# Patient Record
Sex: MALE | Race: WHITE | NOT HISPANIC OR LATINO | URBAN - METROPOLITAN AREA
[De-identification: names, ages, dates, MRNs, and addresses within clinical notes are randomized per-mention and may not be internally consistent; named-entity substitution may affect disease eponyms.]

---

## 2023-01-30 ENCOUNTER — OUTPATIENT (OUTPATIENT)
Dept: OUTPATIENT SERVICES | Facility: HOSPITAL | Age: 68
LOS: 1 days | End: 2023-01-30
Payer: COMMERCIAL

## 2023-01-30 VITALS
TEMPERATURE: 98 F | RESPIRATION RATE: 16 BRPM | WEIGHT: 255.74 LBS | SYSTOLIC BLOOD PRESSURE: 150 MMHG | DIASTOLIC BLOOD PRESSURE: 90 MMHG | OXYGEN SATURATION: 97 % | HEART RATE: 78 BPM | HEIGHT: 67 IN

## 2023-01-30 DIAGNOSIS — N20.0 CALCULUS OF KIDNEY: ICD-10-CM

## 2023-01-30 DIAGNOSIS — E11.9 TYPE 2 DIABETES MELLITUS WITHOUT COMPLICATIONS: ICD-10-CM

## 2023-01-30 DIAGNOSIS — R06.09 OTHER FORMS OF DYSPNEA: ICD-10-CM

## 2023-01-30 DIAGNOSIS — J84.10 PULMONARY FIBROSIS, UNSPECIFIED: ICD-10-CM

## 2023-01-30 DIAGNOSIS — E66.01 MORBID (SEVERE) OBESITY DUE TO EXCESS CALORIES: ICD-10-CM

## 2023-01-30 DIAGNOSIS — I10 ESSENTIAL (PRIMARY) HYPERTENSION: ICD-10-CM

## 2023-01-30 DIAGNOSIS — Z98.890 OTHER SPECIFIED POSTPROCEDURAL STATES: Chronic | ICD-10-CM

## 2023-01-30 LAB
A1C WITH ESTIMATED AVERAGE GLUCOSE RESULT: 7.7 % — HIGH (ref 4–5.6)
ANION GAP SERPL CALC-SCNC: 10 MMOL/L — SIGNIFICANT CHANGE UP (ref 7–14)
APPEARANCE UR: CLEAR — SIGNIFICANT CHANGE UP
BILIRUB UR-MCNC: NEGATIVE — SIGNIFICANT CHANGE UP
BLD GP AB SCN SERPL QL: NEGATIVE — SIGNIFICANT CHANGE UP
BUN SERPL-MCNC: 17 MG/DL — SIGNIFICANT CHANGE UP (ref 7–23)
CALCIUM SERPL-MCNC: 9.6 MG/DL — SIGNIFICANT CHANGE UP (ref 8.4–10.5)
CHLORIDE SERPL-SCNC: 102 MMOL/L — SIGNIFICANT CHANGE UP (ref 98–107)
CO2 SERPL-SCNC: 25 MMOL/L — SIGNIFICANT CHANGE UP (ref 22–31)
COLOR SPEC: YELLOW — SIGNIFICANT CHANGE UP
CREAT SERPL-MCNC: 0.98 MG/DL — SIGNIFICANT CHANGE UP (ref 0.5–1.3)
DIFF PNL FLD: ABNORMAL
EGFR: 85 ML/MIN/1.73M2 — SIGNIFICANT CHANGE UP
ESTIMATED AVERAGE GLUCOSE: 174 — SIGNIFICANT CHANGE UP
GLUCOSE SERPL-MCNC: 118 MG/DL — HIGH (ref 70–99)
GLUCOSE UR QL: NEGATIVE — SIGNIFICANT CHANGE UP
HCT VFR BLD CALC: 43.2 % — SIGNIFICANT CHANGE UP (ref 39–50)
HGB BLD-MCNC: 14.1 G/DL — SIGNIFICANT CHANGE UP (ref 13–17)
KETONES UR-MCNC: NEGATIVE — SIGNIFICANT CHANGE UP
LEUKOCYTE ESTERASE UR-ACNC: ABNORMAL
MCHC RBC-ENTMCNC: 29.2 PG — SIGNIFICANT CHANGE UP (ref 27–34)
MCHC RBC-ENTMCNC: 32.6 GM/DL — SIGNIFICANT CHANGE UP (ref 32–36)
MCV RBC AUTO: 89.4 FL — SIGNIFICANT CHANGE UP (ref 80–100)
NITRITE UR-MCNC: NEGATIVE — SIGNIFICANT CHANGE UP
NRBC # BLD: 0 /100 WBCS — SIGNIFICANT CHANGE UP (ref 0–0)
NRBC # FLD: 0 K/UL — SIGNIFICANT CHANGE UP (ref 0–0)
PH UR: 5.5 — SIGNIFICANT CHANGE UP (ref 5–8)
PLATELET # BLD AUTO: 217 K/UL — SIGNIFICANT CHANGE UP (ref 150–400)
POTASSIUM SERPL-MCNC: 3.9 MMOL/L — SIGNIFICANT CHANGE UP (ref 3.5–5.3)
POTASSIUM SERPL-SCNC: 3.9 MMOL/L — SIGNIFICANT CHANGE UP (ref 3.5–5.3)
PROT UR-MCNC: ABNORMAL
RBC # BLD: 4.83 M/UL — SIGNIFICANT CHANGE UP (ref 4.2–5.8)
RBC # FLD: 13 % — SIGNIFICANT CHANGE UP (ref 10.3–14.5)
RH IG SCN BLD-IMP: POSITIVE — SIGNIFICANT CHANGE UP
SODIUM SERPL-SCNC: 137 MMOL/L — SIGNIFICANT CHANGE UP (ref 135–145)
SP GR SPEC: 1.02 — SIGNIFICANT CHANGE UP (ref 1.01–1.05)
UROBILINOGEN FLD QL: SIGNIFICANT CHANGE UP
WBC # BLD: 9.45 K/UL — SIGNIFICANT CHANGE UP (ref 3.8–10.5)
WBC # FLD AUTO: 9.45 K/UL — SIGNIFICANT CHANGE UP (ref 3.8–10.5)

## 2023-01-30 PROCEDURE — 93010 ELECTROCARDIOGRAM REPORT: CPT

## 2023-01-30 RX ORDER — SODIUM CHLORIDE 9 MG/ML
1000 INJECTION, SOLUTION INTRAVENOUS
Refills: 0 | Status: DISCONTINUED | OUTPATIENT
Start: 2023-02-14 | End: 2023-02-15

## 2023-01-30 NOTE — H&P PST ADULT - OTHER CARE PROVIDERS
Dr. Adams, Cardiologist; Dr. Lakhani, Pulmonologist Dr. Adams, Cardiologist; [phone]; Dr. Lakhani, Pulmonologist [phone]

## 2023-01-30 NOTE — H&P PST ADULT - GENITOURINARY COMMENTS
dyspnea when climbing 1 fl stairs. Pt was evaluated by Cardiologist, with normal findings.  Pt saw pulmonologist, CT chest was done, dx with pulmonary fibrosis . Kidney stone (L) incidental  finding on imaging.    Scheduled for Left percutaneous Nephrolithotomy not examined

## 2023-01-30 NOTE — H&P PST ADULT - CARDIOVASCULAR COMMENTS
Pt reports he has SOB on exertion when climbing  1 fl stairs Pt reports he has SOB on exertion when climbing  1 fl stairs.  METS<4

## 2023-01-30 NOTE — H&P PST ADULT - RESPIRATORY AND THORAX COMMENTS
hx of dyspnea when climbing 1 fl stairs. Pt was evaluated by Cardiologist, with normal findings.  Pt saw pulmonologist, CT chest was done, dx with pulmonary fibrosis.  followed q 6 months. hx of dyspnea when climbing 1 fl stairs. Pt was evaluated by Cardiologist, with normal findings.  Pt saw pulmonologist, CT chest was done, dx with pulmonary fibrosis,   followed q 6 months..  No O2

## 2023-01-30 NOTE — H&P PST ADULT - PROBLEM SELECTOR PLAN 3
Spontaneous, unlabored and symmetrical
BMI 40.1, OR booking informed.  CHELO precautions per Stop Bang Questionnaire criteria

## 2023-01-30 NOTE — H&P PST ADULT - PROBLEM SELECTOR PLAN 2
/90 at PST.  I called PMD office, spoke with juan.  Preop Clearance requested on chart.  pt advised to take his  amlodipine and Irbesartan morning of surgery with sip of water

## 2023-01-30 NOTE — H&P PST ADULT - PROBLEM SELECTOR PLAN 1
Left percutaneous Nephrolithotomy 2/14/23.    CBC BMP T&S HgbA1C UA CS EKG    Preop instructions and antibacterial soap given and explained (verbal and written), with teach back.     Preop covid testing protocol reviewed with pt

## 2023-01-30 NOTE — H&P PST ADULT - PROBLEM SELECTOR PLAN 5
Pt recently evaluated by Dr. Adams Cardiologist, requested on chart.  most recent cardiology test reports requested on chart

## 2023-01-30 NOTE — H&P PST ADULT - HISTORY OF PRESENT ILLNESS
66 y/o male with hx of dyspnea when climbing 1 fl stairs. Pt was evaluated by Cardiologist, with normal findings.  Pt saw pulmonologist, CT chest was done, dx with pulmonary fibrosis . Kidney stone (L) incidental  finding on imaging.    Scheduled for Left percutaneous Nephrolithotomy

## 2023-01-30 NOTE — H&P PST ADULT - MUSCULOSKELETAL
no calf tenderness negative normal/ROM intact/no calf tenderness/normal gait/strength 5/5 bilateral upper extremities/strength 5/5 bilateral lower extremities

## 2023-01-30 NOTE — H&P PST ADULT - PROBLEM SELECTOR PLAN 6
Pt recently evaluated by Dr. Lakhani, Pulmonologist, requested on chart.  Most recent PFT's requested on chart Pt recently evaluated by Dr. Lakhani, Pulmonologist. Discussed with Dr. Claros.  No Pulmonary clearance required.  Most recent note and PFT's requested on chart

## 2023-01-30 NOTE — H&P PST ADULT - NSICDXPASTMEDICALHX_GEN_ALL_CORE_FT
PAST MEDICAL HISTORY:  Diabetes mellitus     Hypertension     Kidney stone     Morbid obesity     Obesity      PAST MEDICAL HISTORY:  Diabetes mellitus     Hypertension     Kidney stone     Morbid obesity     Obesity     Pulmonary fibrosis

## 2023-01-31 LAB
CULTURE RESULTS: SIGNIFICANT CHANGE UP
SPECIMEN SOURCE: SIGNIFICANT CHANGE UP

## 2023-02-09 ENCOUNTER — NON-APPOINTMENT (OUTPATIENT)
Age: 68
End: 2023-02-09

## 2023-02-13 ENCOUNTER — TRANSCRIPTION ENCOUNTER (OUTPATIENT)
Age: 68
End: 2023-02-13

## 2023-02-13 NOTE — ASU PATIENT PROFILE, ADULT - NSICDXPASTMEDICALHX_GEN_ALL_CORE_FT
PAST MEDICAL HISTORY:  Diabetes mellitus     Hypertension     Kidney stone     Morbid obesity     Obesity     Pulmonary fibrosis

## 2023-02-13 NOTE — ASU PATIENT PROFILE, ADULT - CAREGIVER NAME
Introduction Text (Please End With A Colon): The following procedure was deferred: Procedure To Be Performed At Next Visit: Biopsy by shave method Detail Level: Detailed lydia

## 2023-02-13 NOTE — ASU PATIENT PROFILE, ADULT - FALL HARM RISK - UNIVERSAL INTERVENTIONS
Bed in lowest position, wheels locked, appropriate side rails in place/Call bell, personal items and telephone in reach/Instruct patient to call for assistance before getting out of bed or chair/Non-slip footwear when patient is out of bed/[place] to call system/Physically safe environment - no spills, clutter or unnecessary equipment/Purposeful Proactive Rounding/Room/bathroom lighting operational, light cord in reach

## 2023-02-14 ENCOUNTER — INPATIENT (INPATIENT)
Facility: HOSPITAL | Age: 68
LOS: 0 days | Discharge: HOME | End: 2023-02-15
Attending: UROLOGY | Admitting: UROLOGY

## 2023-02-14 VITALS
RESPIRATION RATE: 16 BRPM | SYSTOLIC BLOOD PRESSURE: 156 MMHG | HEART RATE: 66 BPM | TEMPERATURE: 98 F | OXYGEN SATURATION: 100 % | WEIGHT: 255.74 LBS | HEIGHT: 67 IN | DIASTOLIC BLOOD PRESSURE: 87 MMHG

## 2023-02-14 DIAGNOSIS — Z98.890 OTHER SPECIFIED POSTPROCEDURAL STATES: Chronic | ICD-10-CM

## 2023-02-14 DIAGNOSIS — N20.0 CALCULUS OF KIDNEY: ICD-10-CM

## 2023-02-14 LAB
ANION GAP SERPL CALC-SCNC: 12 MMOL/L — SIGNIFICANT CHANGE UP (ref 7–14)
BASOPHILS # BLD AUTO: 0.03 K/UL — SIGNIFICANT CHANGE UP (ref 0–0.2)
BASOPHILS NFR BLD AUTO: 0.3 % — SIGNIFICANT CHANGE UP (ref 0–2)
BUN SERPL-MCNC: 21 MG/DL — SIGNIFICANT CHANGE UP (ref 7–23)
CALCIUM SERPL-MCNC: 9.5 MG/DL — SIGNIFICANT CHANGE UP (ref 8.4–10.5)
CHLORIDE SERPL-SCNC: 105 MMOL/L — SIGNIFICANT CHANGE UP (ref 98–107)
CO2 SERPL-SCNC: 24 MMOL/L — SIGNIFICANT CHANGE UP (ref 22–31)
CREAT SERPL-MCNC: 0.88 MG/DL — SIGNIFICANT CHANGE UP (ref 0.5–1.3)
EGFR: 94 ML/MIN/1.73M2 — SIGNIFICANT CHANGE UP
EOSINOPHIL # BLD AUTO: 0.09 K/UL — SIGNIFICANT CHANGE UP (ref 0–0.5)
EOSINOPHIL NFR BLD AUTO: 0.9 % — SIGNIFICANT CHANGE UP (ref 0–6)
GLUCOSE BLDC GLUCOMTR-MCNC: 155 MG/DL — HIGH (ref 70–99)
GLUCOSE BLDC GLUCOMTR-MCNC: 160 MG/DL — HIGH (ref 70–99)
GLUCOSE BLDC GLUCOMTR-MCNC: 274 MG/DL — HIGH (ref 70–99)
GLUCOSE BLDC GLUCOMTR-MCNC: 277 MG/DL — HIGH (ref 70–99)
GLUCOSE SERPL-MCNC: 164 MG/DL — HIGH (ref 70–99)
HCT VFR BLD CALC: 41.2 % — SIGNIFICANT CHANGE UP (ref 39–50)
HGB BLD-MCNC: 13.4 G/DL — SIGNIFICANT CHANGE UP (ref 13–17)
IANC: 6.41 K/UL — SIGNIFICANT CHANGE UP (ref 1.8–7.4)
IMM GRANULOCYTES NFR BLD AUTO: 0.5 % — SIGNIFICANT CHANGE UP (ref 0–0.9)
LYMPHOCYTES # BLD AUTO: 2.34 K/UL — SIGNIFICANT CHANGE UP (ref 1–3.3)
LYMPHOCYTES # BLD AUTO: 24.6 % — SIGNIFICANT CHANGE UP (ref 13–44)
MCHC RBC-ENTMCNC: 30 PG — SIGNIFICANT CHANGE UP (ref 27–34)
MCHC RBC-ENTMCNC: 32.5 GM/DL — SIGNIFICANT CHANGE UP (ref 32–36)
MCV RBC AUTO: 92.4 FL — SIGNIFICANT CHANGE UP (ref 80–100)
MONOCYTES # BLD AUTO: 0.59 K/UL — SIGNIFICANT CHANGE UP (ref 0–0.9)
MONOCYTES NFR BLD AUTO: 6.2 % — SIGNIFICANT CHANGE UP (ref 2–14)
NEUTROPHILS # BLD AUTO: 6.41 K/UL — SIGNIFICANT CHANGE UP (ref 1.8–7.4)
NEUTROPHILS NFR BLD AUTO: 67.5 % — SIGNIFICANT CHANGE UP (ref 43–77)
NRBC # BLD: 0 /100 WBCS — SIGNIFICANT CHANGE UP (ref 0–0)
NRBC # FLD: 0 K/UL — SIGNIFICANT CHANGE UP (ref 0–0)
PLATELET # BLD AUTO: 191 K/UL — SIGNIFICANT CHANGE UP (ref 150–400)
POTASSIUM SERPL-MCNC: 4.5 MMOL/L — SIGNIFICANT CHANGE UP (ref 3.5–5.3)
POTASSIUM SERPL-SCNC: 4.5 MMOL/L — SIGNIFICANT CHANGE UP (ref 3.5–5.3)
RBC # BLD: 4.46 M/UL — SIGNIFICANT CHANGE UP (ref 4.2–5.8)
RBC # FLD: 13.4 % — SIGNIFICANT CHANGE UP (ref 10.3–14.5)
RH IG SCN BLD-IMP: POSITIVE — SIGNIFICANT CHANGE UP
SODIUM SERPL-SCNC: 141 MMOL/L — SIGNIFICANT CHANGE UP (ref 135–145)
WBC # BLD: 9.51 K/UL — SIGNIFICANT CHANGE UP (ref 3.8–10.5)
WBC # FLD AUTO: 9.51 K/UL — SIGNIFICANT CHANGE UP (ref 3.8–10.5)

## 2023-02-14 DEVICE — SURGIFLO MATRIX WITH THROMBIN KIT: Type: IMPLANTABLE DEVICE | Site: LEFT | Status: FUNCTIONAL

## 2023-02-14 DEVICE — CATH 810 SET 8.5FR 88X30CM: Type: IMPLANTABLE DEVICE | Site: LEFT | Status: FUNCTIONAL

## 2023-02-14 DEVICE — GUIDEWIRE SENSOR DUAL-FLEX NITINOL STRAIGHT .038" X 150CM: Type: IMPLANTABLE DEVICE | Site: LEFT | Status: FUNCTIONAL

## 2023-02-14 DEVICE — URETERAL STENT PERCUFLEX PLUS 6FR 26CM: Type: IMPLANTABLE DEVICE | Site: LEFT | Status: FUNCTIONAL

## 2023-02-14 DEVICE — NEPHROSTOMY BALLOON CATH NEPHROMAX 24FR 17CM PTFE: Type: IMPLANTABLE DEVICE | Site: LEFT | Status: FUNCTIONAL

## 2023-02-14 DEVICE — TRILOGY PROBE KIT 3.9MM X 440MM (BLUE): Type: IMPLANTABLE DEVICE | Site: LEFT | Status: FUNCTIONAL

## 2023-02-14 DEVICE — URETERAL CATH AXXCESS OPEN END 6FR 70CM: Type: IMPLANTABLE DEVICE | Site: LEFT | Status: FUNCTIONAL

## 2023-02-14 RX ORDER — CEFAZOLIN SODIUM 1 G
VIAL (EA) INJECTION
Refills: 0 | Status: DISCONTINUED | OUTPATIENT
Start: 2023-02-14 | End: 2023-02-15

## 2023-02-14 RX ORDER — DEXTROSE 50 % IN WATER 50 %
15 SYRINGE (ML) INTRAVENOUS ONCE
Refills: 0 | Status: DISCONTINUED | OUTPATIENT
Start: 2023-02-14 | End: 2023-02-15

## 2023-02-14 RX ORDER — HYDROMORPHONE HYDROCHLORIDE 2 MG/ML
0.5 INJECTION INTRAMUSCULAR; INTRAVENOUS; SUBCUTANEOUS
Refills: 0 | Status: DISCONTINUED | OUTPATIENT
Start: 2023-02-14 | End: 2023-02-14

## 2023-02-14 RX ORDER — HEPARIN SODIUM 5000 [USP'U]/ML
5000 INJECTION INTRAVENOUS; SUBCUTANEOUS EVERY 8 HOURS
Refills: 0 | Status: DISCONTINUED | OUTPATIENT
Start: 2023-02-14 | End: 2023-02-15

## 2023-02-14 RX ORDER — SODIUM CHLORIDE 9 MG/ML
1000 INJECTION, SOLUTION INTRAVENOUS
Refills: 0 | Status: DISCONTINUED | OUTPATIENT
Start: 2023-02-14 | End: 2023-02-15

## 2023-02-14 RX ORDER — DEXTROSE 50 % IN WATER 50 %
12.5 SYRINGE (ML) INTRAVENOUS ONCE
Refills: 0 | Status: DISCONTINUED | OUTPATIENT
Start: 2023-02-14 | End: 2023-02-15

## 2023-02-14 RX ORDER — ACETAMINOPHEN 500 MG
975 TABLET ORAL EVERY 6 HOURS
Refills: 0 | Status: DISCONTINUED | OUTPATIENT
Start: 2023-02-14 | End: 2023-02-15

## 2023-02-14 RX ORDER — DEXTROSE 50 % IN WATER 50 %
25 SYRINGE (ML) INTRAVENOUS ONCE
Refills: 0 | Status: DISCONTINUED | OUTPATIENT
Start: 2023-02-14 | End: 2023-02-15

## 2023-02-14 RX ORDER — CEFAZOLIN SODIUM 1 G
1000 VIAL (EA) INJECTION ONCE
Refills: 0 | Status: COMPLETED | OUTPATIENT
Start: 2023-02-14 | End: 2023-02-14

## 2023-02-14 RX ORDER — ONDANSETRON 8 MG/1
4 TABLET, FILM COATED ORAL ONCE
Refills: 0 | Status: DISCONTINUED | OUTPATIENT
Start: 2023-02-14 | End: 2023-02-15

## 2023-02-14 RX ORDER — INSULIN LISPRO 100/ML
VIAL (ML) SUBCUTANEOUS AT BEDTIME
Refills: 0 | Status: DISCONTINUED | OUTPATIENT
Start: 2023-02-14 | End: 2023-02-15

## 2023-02-14 RX ORDER — INSULIN LISPRO 100/ML
VIAL (ML) SUBCUTANEOUS
Refills: 0 | Status: DISCONTINUED | OUTPATIENT
Start: 2023-02-14 | End: 2023-02-15

## 2023-02-14 RX ORDER — AMLODIPINE BESYLATE 2.5 MG/1
5 TABLET ORAL DAILY
Refills: 0 | Status: DISCONTINUED | OUTPATIENT
Start: 2023-02-14 | End: 2023-02-15

## 2023-02-14 RX ORDER — HYDROMORPHONE HYDROCHLORIDE 2 MG/ML
0.5 INJECTION INTRAMUSCULAR; INTRAVENOUS; SUBCUTANEOUS
Refills: 0 | Status: DISCONTINUED | OUTPATIENT
Start: 2023-02-14 | End: 2023-02-15

## 2023-02-14 RX ORDER — OXYCODONE HYDROCHLORIDE 5 MG/1
10 TABLET ORAL EVERY 4 HOURS
Refills: 0 | Status: DISCONTINUED | OUTPATIENT
Start: 2023-02-14 | End: 2023-02-15

## 2023-02-14 RX ORDER — OXYCODONE HYDROCHLORIDE 5 MG/1
5 TABLET ORAL EVERY 4 HOURS
Refills: 0 | Status: DISCONTINUED | OUTPATIENT
Start: 2023-02-14 | End: 2023-02-15

## 2023-02-14 RX ORDER — CEFAZOLIN SODIUM 1 G
1000 VIAL (EA) INJECTION EVERY 8 HOURS
Refills: 0 | Status: DISCONTINUED | OUTPATIENT
Start: 2023-02-14 | End: 2023-02-15

## 2023-02-14 RX ORDER — LOSARTAN POTASSIUM 100 MG/1
100 TABLET, FILM COATED ORAL DAILY
Refills: 0 | Status: DISCONTINUED | OUTPATIENT
Start: 2023-02-14 | End: 2023-02-15

## 2023-02-14 RX ORDER — GLUCAGON INJECTION, SOLUTION 0.5 MG/.1ML
1 INJECTION, SOLUTION SUBCUTANEOUS ONCE
Refills: 0 | Status: DISCONTINUED | OUTPATIENT
Start: 2023-02-14 | End: 2023-02-15

## 2023-02-14 RX ORDER — HYDRALAZINE HCL 50 MG
5 TABLET ORAL ONCE
Refills: 0 | Status: COMPLETED | OUTPATIENT
Start: 2023-02-14 | End: 2023-02-14

## 2023-02-14 RX ADMIN — Medication 6: at 17:00

## 2023-02-14 RX ADMIN — Medication 100 MILLIGRAM(S): at 22:44

## 2023-02-14 RX ADMIN — Medication 5 MILLIGRAM(S): at 18:01

## 2023-02-14 RX ADMIN — Medication 100 MILLIGRAM(S): at 16:40

## 2023-02-14 RX ADMIN — HEPARIN SODIUM 5000 UNIT(S): 5000 INJECTION INTRAVENOUS; SUBCUTANEOUS at 13:58

## 2023-02-14 RX ADMIN — HEPARIN SODIUM 5000 UNIT(S): 5000 INJECTION INTRAVENOUS; SUBCUTANEOUS at 22:43

## 2023-02-14 RX ADMIN — SODIUM CHLORIDE 125 MILLILITER(S): 9 INJECTION, SOLUTION INTRAVENOUS at 17:33

## 2023-02-14 RX ADMIN — Medication 2: at 22:44

## 2023-02-14 NOTE — PATIENT PROFILE ADULT - FALL HARM RISK - HARM RISK INTERVENTIONS
Communicate Risk of Fall with Harm to all staff/Reinforce activity limits and safety measures with patient and family/Tailored Fall Risk Interventions/Visual Cue: Yellow wristband and red socks/Bed in lowest position, wheels locked, appropriate side rails in place/Call bell, personal items and telephone in reach/Instruct patient to call for assistance before getting out of bed or chair/Non-slip footwear when patient is out of bed/[place] to call system/Physically safe environment - no spills, clutter or unnecessary equipment/Purposeful Proactive Rounding/Room/bathroom lighting operational, light cord in reach Assistance with ambulation/Assistance OOB with selected safe patient handling equipment/Communicate Risk of Fall with Harm to all staff/Monitor gait and stability/Reinforce activity limits and safety measures with patient and family/Sit up slowly, dangle for a short time, stand at bedside before walking/Tailored Fall Risk Interventions/Use of alarms - bed, chair and/or voice tab/Visual Cue: Yellow wristband and red socks/Bed in lowest position, wheels locked, appropriate side rails in place/Call bell, personal items and telephone in reach/Instruct patient to call for assistance before getting out of bed or chair/Non-slip footwear when patient is out of bed/[place] to call system/Physically safe environment - no spills, clutter or unnecessary equipment/Purposeful Proactive Rounding/Room/bathroom lighting operational, light cord in reach

## 2023-02-14 NOTE — ASU PREOP CHECKLIST - SELECT TESTS ORDERED
BMP/CBC/Type and Screen fs/BMP/CBC/Type and Screen/POCT Blood Glucose fs at 10:03- 155/BMP/CBC/Type and Screen/POCT Blood Glucose

## 2023-02-15 ENCOUNTER — TRANSCRIPTION ENCOUNTER (OUTPATIENT)
Age: 68
End: 2023-02-15

## 2023-02-15 VITALS
OXYGEN SATURATION: 100 % | DIASTOLIC BLOOD PRESSURE: 78 MMHG | RESPIRATION RATE: 18 BRPM | TEMPERATURE: 98 F | HEART RATE: 88 BPM | SYSTOLIC BLOOD PRESSURE: 140 MMHG

## 2023-02-15 LAB
ANION GAP SERPL CALC-SCNC: 13 MMOL/L — SIGNIFICANT CHANGE UP (ref 7–14)
BASOPHILS # BLD AUTO: 0.01 K/UL — SIGNIFICANT CHANGE UP (ref 0–0.2)
BASOPHILS NFR BLD AUTO: 0.1 % — SIGNIFICANT CHANGE UP (ref 0–2)
BUN SERPL-MCNC: 22 MG/DL — SIGNIFICANT CHANGE UP (ref 7–23)
CALCIUM SERPL-MCNC: 9.6 MG/DL — SIGNIFICANT CHANGE UP (ref 8.4–10.5)
CHLORIDE SERPL-SCNC: 99 MMOL/L — SIGNIFICANT CHANGE UP (ref 98–107)
CO2 SERPL-SCNC: 24 MMOL/L — SIGNIFICANT CHANGE UP (ref 22–31)
CREAT SERPL-MCNC: 1 MG/DL — SIGNIFICANT CHANGE UP (ref 0.5–1.3)
EGFR: 82 ML/MIN/1.73M2 — SIGNIFICANT CHANGE UP
EOSINOPHIL # BLD AUTO: 0 K/UL — SIGNIFICANT CHANGE UP (ref 0–0.5)
EOSINOPHIL NFR BLD AUTO: 0 % — SIGNIFICANT CHANGE UP (ref 0–6)
GLUCOSE BLDC GLUCOMTR-MCNC: 179 MG/DL — HIGH (ref 70–99)
GLUCOSE BLDC GLUCOMTR-MCNC: 229 MG/DL — HIGH (ref 70–99)
GLUCOSE SERPL-MCNC: 182 MG/DL — HIGH (ref 70–99)
HCT VFR BLD CALC: 39.6 % — SIGNIFICANT CHANGE UP (ref 39–50)
HGB BLD-MCNC: 13.1 G/DL — SIGNIFICANT CHANGE UP (ref 13–17)
IANC: 11.33 K/UL — HIGH (ref 1.8–7.4)
IMM GRANULOCYTES NFR BLD AUTO: 0.5 % — SIGNIFICANT CHANGE UP (ref 0–0.9)
LYMPHOCYTES # BLD AUTO: 0.76 K/UL — LOW (ref 1–3.3)
LYMPHOCYTES # BLD AUTO: 5.9 % — LOW (ref 13–44)
MCHC RBC-ENTMCNC: 29.8 PG — SIGNIFICANT CHANGE UP (ref 27–34)
MCHC RBC-ENTMCNC: 33.1 GM/DL — SIGNIFICANT CHANGE UP (ref 32–36)
MCV RBC AUTO: 90 FL — SIGNIFICANT CHANGE UP (ref 80–100)
MONOCYTES # BLD AUTO: 0.64 K/UL — SIGNIFICANT CHANGE UP (ref 0–0.9)
MONOCYTES NFR BLD AUTO: 5 % — SIGNIFICANT CHANGE UP (ref 2–14)
NEUTROPHILS # BLD AUTO: 11.33 K/UL — HIGH (ref 1.8–7.4)
NEUTROPHILS NFR BLD AUTO: 88.5 % — HIGH (ref 43–77)
NRBC # BLD: 0 /100 WBCS — SIGNIFICANT CHANGE UP (ref 0–0)
NRBC # FLD: 0 K/UL — SIGNIFICANT CHANGE UP (ref 0–0)
PLATELET # BLD AUTO: 233 K/UL — SIGNIFICANT CHANGE UP (ref 150–400)
POTASSIUM SERPL-MCNC: 4.4 MMOL/L — SIGNIFICANT CHANGE UP (ref 3.5–5.3)
POTASSIUM SERPL-SCNC: 4.4 MMOL/L — SIGNIFICANT CHANGE UP (ref 3.5–5.3)
RBC # BLD: 4.4 M/UL — SIGNIFICANT CHANGE UP (ref 4.2–5.8)
RBC # FLD: 13.3 % — SIGNIFICANT CHANGE UP (ref 10.3–14.5)
SODIUM SERPL-SCNC: 136 MMOL/L — SIGNIFICANT CHANGE UP (ref 135–145)
WBC # BLD: 12.81 K/UL — HIGH (ref 3.8–10.5)
WBC # FLD AUTO: 12.81 K/UL — HIGH (ref 3.8–10.5)

## 2023-02-15 RX ORDER — ACETAMINOPHEN 500 MG
3 TABLET ORAL
Qty: 0 | Refills: 0 | DISCHARGE
Start: 2023-02-15

## 2023-02-15 RX ORDER — SODIUM CHLORIDE 9 MG/ML
1000 INJECTION, SOLUTION INTRAVENOUS
Refills: 0 | Status: DISCONTINUED | OUTPATIENT
Start: 2023-02-15 | End: 2023-02-15

## 2023-02-15 RX ORDER — CEPHALEXIN 500 MG
1 CAPSULE ORAL
Qty: 10 | Refills: 0
Start: 2023-02-15 | End: 2023-02-19

## 2023-02-15 RX ADMIN — HEPARIN SODIUM 5000 UNIT(S): 5000 INJECTION INTRAVENOUS; SUBCUTANEOUS at 13:13

## 2023-02-15 RX ADMIN — Medication 2: at 07:48

## 2023-02-15 RX ADMIN — SODIUM CHLORIDE 75 MILLILITER(S): 9 INJECTION, SOLUTION INTRAVENOUS at 08:01

## 2023-02-15 RX ADMIN — Medication 100 MILLIGRAM(S): at 05:27

## 2023-02-15 RX ADMIN — LOSARTAN POTASSIUM 100 MILLIGRAM(S): 100 TABLET, FILM COATED ORAL at 05:26

## 2023-02-15 RX ADMIN — HEPARIN SODIUM 5000 UNIT(S): 5000 INJECTION INTRAVENOUS; SUBCUTANEOUS at 05:27

## 2023-02-15 RX ADMIN — SODIUM CHLORIDE 125 MILLILITER(S): 9 INJECTION, SOLUTION INTRAVENOUS at 02:21

## 2023-02-15 RX ADMIN — AMLODIPINE BESYLATE 5 MILLIGRAM(S): 2.5 TABLET ORAL at 05:26

## 2023-02-15 RX ADMIN — Medication 100 MILLIGRAM(S): at 13:13

## 2023-02-15 RX ADMIN — Medication 4: at 11:27

## 2023-02-15 NOTE — PROGRESS NOTE ADULT - SUBJECTIVE AND OBJECTIVE BOX
Post op check    This  66 yo M is s/p L. PSE  PMH:  DM; HTN  Pt is awake and alert  Has no c/o  Afeb 164/90 79 96%RA    Abd- soft; NT  Vamsi 650  L. NT - 20   Labs                        13.4   9.51  )-----------( 191      ( 14 Feb 2023 13:20 )             41.2   02-14    141  |  105  |  21  ----------------------------<  164<H>  4.5   |  24  |  0.88    Ca    9.5      14 Feb 2023 13:20    
POD #1  Afeb 158/95 83 95%RA    Pt has no c/o  Abd- soft NT ND   Agustin 500  L. NT - 400 clear
The patient feels well this AM    Vital Signs Last 24 Hrs  T(C): 36.7 (15 Feb 2023 05:27), Max: 36.9 (14 Feb 2023 09:21)  T(F): 98 (15 Feb 2023 05:27), Max: 98.4 (14 Feb 2023 09:21)  HR: 83 (15 Feb 2023 05:27) (61 - 100)  BP: 158/95 (15 Feb 2023 05:27) (147/85 - 172/81)  BP(mean): 101 (14 Feb 2023 14:00) (97 - 105)  RR: 18 (15 Feb 2023 05:27) (12 - 18)  SpO2: 95% (15 Feb 2023 05:27) (95% - 100%)    Parameters below as of 15 Feb 2023 05:27  Patient On (Oxygen Delivery Method): room air        PHYSICAL EXAM:    NAD, well-developed  Abd: soft, NT/ND, No CVAT    Urine: blood tinged, draining well    I&O's Summary    14 Feb 2023 07:01  -  15 Feb 2023 07:00  --------------------------------------------------------  IN: 2150 mL / OUT: 2810 mL / NET: -660 mL        LABS:                        13.4   9.51  )-----------( 191      ( 14 Feb 2023 13:20 )             41.2     02-14    141  |  105  |  21  ----------------------------<  164<H>  4.5   |  24  |  0.88    Ca    9.5      14 Feb 2023 13:20      Prior notes/chart reviewed.

## 2023-02-15 NOTE — DISCHARGE NOTE NURSING/CASE MANAGEMENT/SOCIAL WORK - NSDCPNINST_GEN_ALL_CORE
Notify Dr Torres if you experience any increase in pain not relieved with medication, any redness drainage or swelling around nephrostomy tube insertion site, any dark red blood or clots in urine or any fever >100.5.  Drink plenty of fluids.  Change dressing around d/cd nephrostomy tube site daily and as needed.  N heavy lifting or straining.  Use over the counter stool softeners to assist with constipation.

## 2023-02-15 NOTE — DISCHARGE NOTE PROVIDER - CARE PROVIDER_API CALL
Ben Torres)  Urology  [address], [address]  [place] [address]  Phone: [phone]  Fax: [phone]  Follow Up Time:

## 2023-02-15 NOTE — DISCHARGE NOTE NURSING/CASE MANAGEMENT/SOCIAL WORK - PATIENT PORTAL LINK FT
You can access the FollowMyHealth Patient Portal offered by [facility] by registering at the following website: [email or web address]. By joining [employer]’s FollowMyHealth portal, you will also be able to view your health information using other applications (apps) compatible with our system.

## 2023-02-15 NOTE — DISCHARGE NOTE PROVIDER - HOSPITAL COURSE
Pt had L. PSE/stent yesterday; admitted for bloody urine; labs stable; ambulating; pain is controlled; L. NT removed first, the cantu a few hrs later; flank leaking, was pouched; now pouch is dry; voided dark watermelon ; feels well; home today.

## 2023-02-15 NOTE — DISCHARGE NOTE PROVIDER - NSDCCPCAREPLAN_GEN_ALL_CORE_FT
PRINCIPAL DISCHARGE DIAGNOSIS  Diagnosis: Kidney stone  Assessment and Plan of Treatment: Drink plenty of fluids  Empty pouch on back as needed until dry for 24 hrs, then may remove;  notify your doctor for fever over 101; urine that turns bright red like ketchup; if you suddenly can't urinate; or if leakage into pouch suddenly fills the whole bag over 24 hrs.

## 2023-02-15 NOTE — DISCHARGE NOTE NURSING/CASE MANAGEMENT/SOCIAL WORK - NSDCPEFALRISK_GEN_ALL_CORE
For information on Fall & Injury Prevention, visit: [email or web address] OR  [email or web address] OR  https://www.cdc.gov/steadi/patient.html

## 2023-02-15 NOTE — DISCHARGE NOTE PROVIDER - NSDCMRMEDTOKEN_GEN_ALL_CORE_FT
acetaminophen 325 mg oral tablet: 3 tab(s) orally every 6 hours, As needed, Mild Pain (1 - 3)  amLODIPine 5 mg oral tablet: 1 tab(s) orally once a day AM  cephalexin 500 mg oral tablet: 1 tab(s) orally 2 times a day   irbesartan 300 mg oral tablet: 1 tab(s) orally once a day AM  Januvia 100 mg oral tablet: 1 tab(s) orally once a day AM

## 2023-02-18 LAB — NIDUS STONE QN: SIGNIFICANT CHANGE UP

## 2023-03-29 PROBLEM — E66.01 MORBID (SEVERE) OBESITY DUE TO EXCESS CALORIES: Chronic | Status: ACTIVE | Noted: 2023-01-30

## 2023-03-29 PROBLEM — J84.10 PULMONARY FIBROSIS, UNSPECIFIED: Chronic | Status: ACTIVE | Noted: 2023-01-30

## 2023-03-29 PROBLEM — N20.0 CALCULUS OF KIDNEY: Chronic | Status: ACTIVE | Noted: 2023-01-30

## 2023-03-29 PROBLEM — I10 ESSENTIAL (PRIMARY) HYPERTENSION: Chronic | Status: ACTIVE | Noted: 2023-01-30

## 2023-03-29 PROBLEM — E11.9 TYPE 2 DIABETES MELLITUS WITHOUT COMPLICATIONS: Chronic | Status: ACTIVE | Noted: 2023-01-30

## 2023-03-29 PROBLEM — E66.9 OBESITY, UNSPECIFIED: Chronic | Status: ACTIVE | Noted: 2023-01-30

## 2023-04-10 ENCOUNTER — OUTPATIENT (OUTPATIENT)
Dept: OUTPATIENT SERVICES | Facility: HOSPITAL | Age: 68
LOS: 1 days | End: 2023-04-10

## 2023-04-10 VITALS
OXYGEN SATURATION: 98 % | RESPIRATION RATE: 16 BRPM | HEIGHT: 68 IN | TEMPERATURE: 98 F | DIASTOLIC BLOOD PRESSURE: 84 MMHG | HEART RATE: 69 BPM | SYSTOLIC BLOOD PRESSURE: 145 MMHG | WEIGHT: 251.99 LBS

## 2023-04-10 DIAGNOSIS — J84.10 PULMONARY FIBROSIS, UNSPECIFIED: ICD-10-CM

## 2023-04-10 DIAGNOSIS — E11.9 TYPE 2 DIABETES MELLITUS WITHOUT COMPLICATIONS: ICD-10-CM

## 2023-04-10 DIAGNOSIS — Z98.890 OTHER SPECIFIED POSTPROCEDURAL STATES: Chronic | ICD-10-CM

## 2023-04-10 DIAGNOSIS — N20.0 CALCULUS OF KIDNEY: ICD-10-CM

## 2023-04-10 DIAGNOSIS — I10 ESSENTIAL (PRIMARY) HYPERTENSION: ICD-10-CM

## 2023-04-10 DIAGNOSIS — R06.09 OTHER FORMS OF DYSPNEA: ICD-10-CM

## 2023-04-10 LAB
A1C WITH ESTIMATED AVERAGE GLUCOSE RESULT: 7.2 % — HIGH (ref 4–5.6)
ANION GAP SERPL CALC-SCNC: 14 MMOL/L — SIGNIFICANT CHANGE UP (ref 7–14)
APPEARANCE UR: CLEAR — SIGNIFICANT CHANGE UP
BACTERIA # UR AUTO: NEGATIVE — SIGNIFICANT CHANGE UP
BILIRUB UR-MCNC: NEGATIVE — SIGNIFICANT CHANGE UP
BLD GP AB SCN SERPL QL: NEGATIVE — SIGNIFICANT CHANGE UP
BUN SERPL-MCNC: 18 MG/DL — SIGNIFICANT CHANGE UP (ref 7–23)
CALCIUM SERPL-MCNC: 9.6 MG/DL — SIGNIFICANT CHANGE UP (ref 8.4–10.5)
CHLORIDE SERPL-SCNC: 101 MMOL/L — SIGNIFICANT CHANGE UP (ref 98–107)
CO2 SERPL-SCNC: 26 MMOL/L — SIGNIFICANT CHANGE UP (ref 22–31)
COLOR SPEC: SIGNIFICANT CHANGE UP
CREAT SERPL-MCNC: 0.97 MG/DL — SIGNIFICANT CHANGE UP (ref 0.5–1.3)
DIFF PNL FLD: ABNORMAL
EGFR: 86 ML/MIN/1.73M2 — SIGNIFICANT CHANGE UP
EPI CELLS # UR: 1 /HPF — SIGNIFICANT CHANGE UP (ref 0–5)
ESTIMATED AVERAGE GLUCOSE: 160 — SIGNIFICANT CHANGE UP
GLUCOSE SERPL-MCNC: 155 MG/DL — HIGH (ref 70–99)
GLUCOSE UR QL: NEGATIVE — SIGNIFICANT CHANGE UP
HCT VFR BLD CALC: 44.3 % — SIGNIFICANT CHANGE UP (ref 39–50)
HGB BLD-MCNC: 14.2 G/DL — SIGNIFICANT CHANGE UP (ref 13–17)
KETONES UR-MCNC: NEGATIVE — SIGNIFICANT CHANGE UP
LEUKOCYTE ESTERASE UR-ACNC: NEGATIVE — SIGNIFICANT CHANGE UP
MCHC RBC-ENTMCNC: 29.3 PG — SIGNIFICANT CHANGE UP (ref 27–34)
MCHC RBC-ENTMCNC: 32.1 GM/DL — SIGNIFICANT CHANGE UP (ref 32–36)
MCV RBC AUTO: 91.3 FL — SIGNIFICANT CHANGE UP (ref 80–100)
NITRITE UR-MCNC: NEGATIVE — SIGNIFICANT CHANGE UP
NRBC # BLD: 0 /100 WBCS — SIGNIFICANT CHANGE UP (ref 0–0)
NRBC # FLD: 0 K/UL — SIGNIFICANT CHANGE UP (ref 0–0)
PH UR: 7 — SIGNIFICANT CHANGE UP (ref 5–8)
PLATELET # BLD AUTO: 231 K/UL — SIGNIFICANT CHANGE UP (ref 150–400)
POTASSIUM SERPL-MCNC: 4 MMOL/L — SIGNIFICANT CHANGE UP (ref 3.5–5.3)
POTASSIUM SERPL-SCNC: 4 MMOL/L — SIGNIFICANT CHANGE UP (ref 3.5–5.3)
PROT UR-MCNC: ABNORMAL
RBC # BLD: 4.85 M/UL — SIGNIFICANT CHANGE UP (ref 4.2–5.8)
RBC # FLD: 13.1 % — SIGNIFICANT CHANGE UP (ref 10.3–14.5)
RBC CASTS # UR COMP ASSIST: 19 /HPF — HIGH (ref 0–4)
RH IG SCN BLD-IMP: POSITIVE — SIGNIFICANT CHANGE UP
SODIUM SERPL-SCNC: 141 MMOL/L — SIGNIFICANT CHANGE UP (ref 135–145)
SP GR SPEC: 1.01 — SIGNIFICANT CHANGE UP (ref 1.01–1.05)
UROBILINOGEN FLD QL: SIGNIFICANT CHANGE UP
WBC # BLD: 7.88 K/UL — SIGNIFICANT CHANGE UP (ref 3.8–10.5)
WBC # FLD AUTO: 7.88 K/UL — SIGNIFICANT CHANGE UP (ref 3.8–10.5)
WBC UR QL: 3 /HPF — SIGNIFICANT CHANGE UP (ref 0–5)

## 2023-04-10 NOTE — H&P PST ADULT - PROBLEM SELECTOR PLAN 1
Patient tentatively scheduled for Left percutaneous Nephrolithotomy    Pre-op instructions provided. Pt given verbal and written instructions with teach back on chlorhexidine shampoo and Pepcid. Pt verbalized understanding with return demonstration.    Pt has a scheduled preop COVID test.

## 2023-04-10 NOTE — H&P PST ADULT - NSICDXPASTSURGICALHX_GEN_ALL_CORE_FT
PAST SURGICAL HISTORY:  H/O colonoscopy     H/O lithotripsy     H/O nephrolithotomy with removal of calculi

## 2023-04-10 NOTE — H&P PST ADULT - HISTORY OF PRESENT ILLNESS
68 y/o male with hx of dyspnea when climbing 1 fl stairs. Pt was evaluated by Cardiologist, with normal findings.  Pt saw pulmonologist, CT chest was done, dx with pulmonary fibrosis . Kidney stone (L) incidental  finding on imaging.   s/p  Left percutaneous Nephrolithotomy in Feb 2023, unable remove stones completely due to bleeding, now patient is scheduled for second left percutaneous nephrolithotomy with fluoroscopy

## 2023-04-10 NOTE — H&P PST ADULT - PROBLEM SELECTOR PLAN 2
Patient instructed to take Amlodipine and Irbesartan  with a sip of water on the morning of procedure.

## 2023-04-10 NOTE — H&P PST ADULT - RESPIRATORY AND THORAX COMMENTS
hx of dyspnea when climbing 1 fl stairs. Pt was evaluated by Cardiologist, with normal findings.  Pt saw pulmonologist, CT chest was done, dx with pulmonary fibrosis,   followed q 6 months..  No O2

## 2023-04-10 NOTE — H&P PST ADULT - SOURCE OF INFORMATION, PROFILE
Subjective:   He presents for 2-3 days of productive coughing with discolored drainage wheezing and shortness of breath. No fevers. He was seen in the emergency room after falling by his barn down a steep area. They put him in a walking boot but his radiology interpretation of the x-rays were negative they wanted him to see orthopedic he does not want see ortho. Is using crutches and a walking boot    He is allergic to lyrica [pregabalin]; reglan [metoclopramide]; and simvastatin. Objective:   /65   Pulse 72   Temp 97.8 °F (36.6 °C) (Oral)   SpO2 95%   No results found for this visit on 05/07/19. Exam:  Gen. Appearance: Ill appearing but nontoxic, Atraumatic HEENT:  External ears normal, tympanic membranes normal and canals clear. Nose congested. Throat red. Neck: no rigidity, no tenderness, supple. Lungs: whezzing to auscultation bilaterally. Assessment and Plan:   Diagnosis Orders   1. Moderate persistent asthma with acute exacerbation  predniSONE (DELTASONE) 10 MG tablet    amoxicillin-clavulanate (AUGMENTIN) 875-125 MG per tablet     Discussed his x-ray report. Patient states he's given to take the boot off and see how he does. Call or return to office prn if these symptoms worsen or fail to improve as anticipated. Avoid tobacco products exposure. The Healthy Family Handout was given to the patient today.   Tramaine Sotelo M.D. patient

## 2023-04-10 NOTE — H&P PST ADULT - ASSESSMENT
Name of consulting  provider:  Dr Vickie Santoyo       Reason for consultation/initial office visit:  OA      History of Present Illness:  77 yo woman with history of OA of few years, is seeing ortho for left knee and surgery in planned for spring. She has pain in the hands lucio thumbs, right more than left, started a few years prior, the pain is worse with twisting/gripping action with thumb, severe at times, has been taking ibuprofen with some relief. Left elbow has been hurting for the past one month. She fell and broke left wrist about 5 yrs ago had surgery done for that.       Review of Systems:  Rest of the ROS(Review of Systems) reviewed and negative.     Allergies:  Reviewed     Medications:  Reviewed     Family History:  Mother had OA in the hands with deformities     Social History:  Not working   Non smoker       Past Medical History:  GERD  Hypothyroidism   Insomnia  Osteopenia  HLP  Osteopenia with high FRAX as of 2017 but no treatment   Right shoulder rotator cuff surgery       PHYSICAL EXAMINATION:  VITAL SIGNS:   Vitals:    01/28/21 1027   BP: 124/75   Pulse: (!) 56   Resp: 16     GENERAL: pleasant older woman   EYES: Normal exam of conjunctiva and  lids. Pupils symmetric, reactive to light.   LYMPHATICS: No enlarged glands noted in the neck as well as supraclavicular region.  NECK:  No masses noted. Normal exam of the thyroid gland.   CARDIOVASCULAR:  Normal heart sounds and no pedal edema.   LUNGS:  Clear to auscultation.  No rales or wheezing. Breathing is  unlabored.  SKIN:  No rashes, digital ulcers, periungual erythema, nail pitting,  nodules, or sclerodactyly. Skin is warm to touch, no induration noted.  PSYCHIATRIC: Normal mood and affect. Normal judgment and insight.   MUSCULOSKELETAL:  Bilateral upper extremities: [place] and abilio nodes lucio right 3rd digit, limited flexion right 3rd digit. Tender CMCs, right worse. Tender left elbow over the lateral epicondyle. Full ROM at both elbows.   No  tenderness or swelling noted at rest of hand joints, wrists, with full ROM(range of motion). No deformities noted.   Full ROM at the shoulder(right), impingement at the left shoulder.    Normal strength.   Bilateral lower extremities: Full ROM at the hip joints. No tenderness or swelling at the knees with full ROM. Full ROM at the ankles.   Normal muscle strength.       Laboratory Data:  Normal vitamin D from 11/19    Imaging Data:  Moderate OA left CMC on wrist xray    dexa from 2017 showing T score of -1.6 with FRAX score of 26 and 13%      Assessment:  79 yo woman with diffuse OA, low probability for RA. Left elbow has lateral epicondylitis. Also Osteopenia with high FRAX score.       Plan:  Start Alendronate for Osteopenia with high frax score , check dexa  Discussed the precautions with taking Alendronate that would include taking it first thing in the morning, with full glass of water, and to be upright for 30 minutes after taking this medication. These precautions would reduce the risk of esophogeal toxicity.   Discussed the side effects associated with bisphosphonate including GI toxicity, arthralgia, rare risk of atypical fractures and ONJ.  For OA, try steroid injection in the right CMC   Procedure:   Informed consent was obtained. Site was marked and cleaned with ChloraPrep, 1% lidocaine used for local anesthesia. Using a 25 gauge needle 20  mg of kenalog injected into the right CMC joint.  without any complications.       RTC 1 yr, earlier prn                                                   Kidney stone (L) incidental  finding on imaging.    Scheduled for Left percutaneous Nephrolithotomy

## 2023-04-10 NOTE — H&P PST ADULT - MUSCULOSKELETAL
normal/ROM intact/no calf tenderness/normal gait/strength 5/5 bilateral upper extremities/strength 5/5 bilateral lower extremities negative

## 2023-04-10 NOTE — H&P PST ADULT - NSICDXFAMILYHX_GEN_ALL_CORE_FT
FAMILY HISTORY:  Mother  Still living? Unknown  Family history of pulmonary fibrosis, Age at diagnosis: Age Unknown    Sibling  Still living? Unknown  Family history of pulmonary fibrosis, Age at diagnosis: Age Unknown

## 2023-04-10 NOTE — H&P PST ADULT - PROBLEM SELECTOR PLAN 4
hx of dyspnea when climbing 1 fl stairs. Pt was evaluated by Cardiologist, with normal findings.      requesting recent copy of recent ECHO and Stress study

## 2023-04-11 LAB
CULTURE RESULTS: SIGNIFICANT CHANGE UP
SPECIMEN SOURCE: SIGNIFICANT CHANGE UP

## 2023-04-26 ENCOUNTER — TRANSCRIPTION ENCOUNTER (OUTPATIENT)
Age: 68
End: 2023-04-26

## 2023-04-26 NOTE — ASU PATIENT PROFILE, ADULT - FALL HARM RISK - UNIVERSAL INTERVENTIONS
Bed in lowest position, wheels locked, appropriate side rails in place/Call bell, personal items and telephone in reach/Instruct patient to call for assistance before getting out of bed or chair/Non-slip footwear when patient is out of bed/[place] to call system/Physically safe environment - no spills, clutter or unnecessary equipment/Purposeful Proactive Rounding/Room/bathroom lighting operational, light cord in reach

## 2023-04-27 ENCOUNTER — INPATIENT (INPATIENT)
Facility: HOSPITAL | Age: 68
LOS: 0 days | Discharge: HOME | End: 2023-04-28
Attending: UROLOGY | Admitting: UROLOGY
Payer: COMMERCIAL

## 2023-04-27 VITALS
HEART RATE: 65 BPM | DIASTOLIC BLOOD PRESSURE: 83 MMHG | OXYGEN SATURATION: 98 % | TEMPERATURE: 98 F | WEIGHT: 251.99 LBS | SYSTOLIC BLOOD PRESSURE: 155 MMHG | HEIGHT: 68 IN | RESPIRATION RATE: 16 BRPM

## 2023-04-27 DIAGNOSIS — Z98.890 OTHER SPECIFIED POSTPROCEDURAL STATES: Chronic | ICD-10-CM

## 2023-04-27 DIAGNOSIS — N20.0 CALCULUS OF KIDNEY: ICD-10-CM

## 2023-04-27 LAB
ANION GAP SERPL CALC-SCNC: 13 MMOL/L — SIGNIFICANT CHANGE UP (ref 7–14)
BASOPHILS # BLD AUTO: 0.02 K/UL — SIGNIFICANT CHANGE UP (ref 0–0.2)
BASOPHILS NFR BLD AUTO: 0.2 % — SIGNIFICANT CHANGE UP (ref 0–2)
BUN SERPL-MCNC: 18 MG/DL — SIGNIFICANT CHANGE UP (ref 7–23)
CALCIUM SERPL-MCNC: 9.1 MG/DL — SIGNIFICANT CHANGE UP (ref 8.4–10.5)
CHLORIDE SERPL-SCNC: 100 MMOL/L — SIGNIFICANT CHANGE UP (ref 98–107)
CO2 SERPL-SCNC: 20 MMOL/L — LOW (ref 22–31)
CREAT SERPL-MCNC: 1.05 MG/DL — SIGNIFICANT CHANGE UP (ref 0.5–1.3)
EGFR: 78 ML/MIN/1.73M2 — SIGNIFICANT CHANGE UP
EOSINOPHIL # BLD AUTO: 0.04 K/UL — SIGNIFICANT CHANGE UP (ref 0–0.5)
EOSINOPHIL NFR BLD AUTO: 0.4 % — SIGNIFICANT CHANGE UP (ref 0–6)
GLUCOSE BLDC GLUCOMTR-MCNC: 164 MG/DL — HIGH (ref 70–99)
GLUCOSE BLDC GLUCOMTR-MCNC: 230 MG/DL — HIGH (ref 70–99)
GLUCOSE BLDC GLUCOMTR-MCNC: 237 MG/DL — HIGH (ref 70–99)
GLUCOSE BLDC GLUCOMTR-MCNC: 244 MG/DL — HIGH (ref 70–99)
GLUCOSE BLDC GLUCOMTR-MCNC: 245 MG/DL — HIGH (ref 70–99)
GLUCOSE BLDC GLUCOMTR-MCNC: 279 MG/DL — HIGH (ref 70–99)
GLUCOSE SERPL-MCNC: 237 MG/DL — HIGH (ref 70–99)
HCT VFR BLD CALC: 39.6 % — SIGNIFICANT CHANGE UP (ref 39–50)
HGB BLD-MCNC: 13.1 G/DL — SIGNIFICANT CHANGE UP (ref 13–17)
IANC: 7.8 K/UL — HIGH (ref 1.8–7.4)
IMM GRANULOCYTES NFR BLD AUTO: 0.7 % — SIGNIFICANT CHANGE UP (ref 0–0.9)
LYMPHOCYTES # BLD AUTO: 1.08 K/UL — SIGNIFICANT CHANGE UP (ref 1–3.3)
LYMPHOCYTES # BLD AUTO: 11.8 % — LOW (ref 13–44)
MCHC RBC-ENTMCNC: 29.3 PG — SIGNIFICANT CHANGE UP (ref 27–34)
MCHC RBC-ENTMCNC: 33.1 GM/DL — SIGNIFICANT CHANGE UP (ref 32–36)
MCV RBC AUTO: 88.6 FL — SIGNIFICANT CHANGE UP (ref 80–100)
MONOCYTES # BLD AUTO: 0.16 K/UL — SIGNIFICANT CHANGE UP (ref 0–0.9)
MONOCYTES NFR BLD AUTO: 1.7 % — LOW (ref 2–14)
NEUTROPHILS # BLD AUTO: 7.8 K/UL — HIGH (ref 1.8–7.4)
NEUTROPHILS NFR BLD AUTO: 85.2 % — HIGH (ref 43–77)
NRBC # BLD: 0 /100 WBCS — SIGNIFICANT CHANGE UP (ref 0–0)
NRBC # FLD: 0 K/UL — SIGNIFICANT CHANGE UP (ref 0–0)
PLATELET # BLD AUTO: 200 K/UL — SIGNIFICANT CHANGE UP (ref 150–400)
POTASSIUM SERPL-MCNC: 4.7 MMOL/L — SIGNIFICANT CHANGE UP (ref 3.5–5.3)
POTASSIUM SERPL-SCNC: 4.7 MMOL/L — SIGNIFICANT CHANGE UP (ref 3.5–5.3)
RBC # BLD: 4.47 M/UL — SIGNIFICANT CHANGE UP (ref 4.2–5.8)
RBC # FLD: 13 % — SIGNIFICANT CHANGE UP (ref 10.3–14.5)
SODIUM SERPL-SCNC: 133 MMOL/L — LOW (ref 135–145)
WBC # BLD: 9.16 K/UL — SIGNIFICANT CHANGE UP (ref 3.8–10.5)
WBC # FLD AUTO: 9.16 K/UL — SIGNIFICANT CHANGE UP (ref 3.8–10.5)

## 2023-04-27 PROCEDURE — 71045 X-RAY EXAM CHEST 1 VIEW: CPT | Mod: 26

## 2023-04-27 DEVICE — URETERAL CATH AXXCESS OPEN END 6FR 70CM: Type: IMPLANTABLE DEVICE | Status: FUNCTIONAL

## 2023-04-27 DEVICE — TRILOGY PROBE KIT 3.9MM X 440MM (BLUE): Type: IMPLANTABLE DEVICE | Status: FUNCTIONAL

## 2023-04-27 DEVICE — NEPHROSTOMY BALLOON CATH NEPHROMAX 24FR 17CM PTFE: Type: IMPLANTABLE DEVICE | Status: FUNCTIONAL

## 2023-04-27 DEVICE — SURGIFLO MATRIX WITH THROMBIN KIT: Type: IMPLANTABLE DEVICE | Status: FUNCTIONAL

## 2023-04-27 DEVICE — GUIDEWIRE SENSOR DUAL-FLEX NITINOL STRAIGHT .038" X 150CM: Type: IMPLANTABLE DEVICE | Status: FUNCTIONAL

## 2023-04-27 DEVICE — URETERAL STENT PERCUFLEX PLUS 6FR 28CM: Type: IMPLANTABLE DEVICE | Status: FUNCTIONAL

## 2023-04-27 DEVICE — CATH 810 SET 8.5FR 88X30CM: Type: IMPLANTABLE DEVICE | Status: FUNCTIONAL

## 2023-04-27 DEVICE — LASER FIBER MOSES 200 D/F/L: Type: IMPLANTABLE DEVICE | Status: FUNCTIONAL

## 2023-04-27 RX ORDER — ACETAMINOPHEN 500 MG
975 TABLET ORAL EVERY 6 HOURS
Refills: 0 | Status: DISCONTINUED | OUTPATIENT
Start: 2023-04-27 | End: 2023-04-27

## 2023-04-27 RX ORDER — SODIUM CHLORIDE 9 MG/ML
1000 INJECTION, SOLUTION INTRAVENOUS
Refills: 0 | Status: DISCONTINUED | OUTPATIENT
Start: 2023-04-27 | End: 2023-04-28

## 2023-04-27 RX ORDER — DEXTROSE 50 % IN WATER 50 %
25 SYRINGE (ML) INTRAVENOUS ONCE
Refills: 0 | Status: DISCONTINUED | OUTPATIENT
Start: 2023-04-27 | End: 2023-04-28

## 2023-04-27 RX ORDER — AMLODIPINE BESYLATE 2.5 MG/1
1 TABLET ORAL
Qty: 0 | Refills: 0 | DISCHARGE

## 2023-04-27 RX ORDER — AMLODIPINE BESYLATE 2.5 MG/1
5 TABLET ORAL DAILY
Refills: 0 | Status: DISCONTINUED | OUTPATIENT
Start: 2023-04-27 | End: 2023-04-28

## 2023-04-27 RX ORDER — POLYETHYLENE GLYCOL 3350 17 G/17G
17 POWDER, FOR SOLUTION ORAL DAILY
Refills: 0 | Status: DISCONTINUED | OUTPATIENT
Start: 2023-04-27 | End: 2023-04-28

## 2023-04-27 RX ORDER — DEXTROSE 50 % IN WATER 50 %
15 SYRINGE (ML) INTRAVENOUS ONCE
Refills: 0 | Status: DISCONTINUED | OUTPATIENT
Start: 2023-04-27 | End: 2023-04-28

## 2023-04-27 RX ORDER — CEFAZOLIN SODIUM 1 G
2000 VIAL (EA) INJECTION EVERY 8 HOURS
Refills: 0 | Status: DISCONTINUED | OUTPATIENT
Start: 2023-04-27 | End: 2023-04-28

## 2023-04-27 RX ORDER — INSULIN LISPRO 100/ML
VIAL (ML) SUBCUTANEOUS AT BEDTIME
Refills: 0 | Status: DISCONTINUED | OUTPATIENT
Start: 2023-04-27 | End: 2023-04-28

## 2023-04-27 RX ORDER — SENNA PLUS 8.6 MG/1
2 TABLET ORAL AT BEDTIME
Refills: 0 | Status: DISCONTINUED | OUTPATIENT
Start: 2023-04-27 | End: 2023-04-28

## 2023-04-27 RX ORDER — SODIUM CHLORIDE 9 MG/ML
1000 INJECTION, SOLUTION INTRAVENOUS
Refills: 0 | Status: DISCONTINUED | OUTPATIENT
Start: 2023-04-27 | End: 2023-04-27

## 2023-04-27 RX ORDER — LOSARTAN POTASSIUM 100 MG/1
100 TABLET, FILM COATED ORAL DAILY
Refills: 0 | Status: DISCONTINUED | OUTPATIENT
Start: 2023-04-27 | End: 2023-04-28

## 2023-04-27 RX ORDER — GLUCAGON INJECTION, SOLUTION 0.5 MG/.1ML
1 INJECTION, SOLUTION SUBCUTANEOUS ONCE
Refills: 0 | Status: DISCONTINUED | OUTPATIENT
Start: 2023-04-27 | End: 2023-04-28

## 2023-04-27 RX ORDER — INSULIN LISPRO 100/ML
VIAL (ML) SUBCUTANEOUS
Refills: 0 | Status: DISCONTINUED | OUTPATIENT
Start: 2023-04-27 | End: 2023-04-28

## 2023-04-27 RX ORDER — IRBESARTAN 75 MG/1
1 TABLET ORAL
Qty: 0 | Refills: 0 | DISCHARGE

## 2023-04-27 RX ORDER — OXYCODONE HYDROCHLORIDE 5 MG/1
5 TABLET ORAL EVERY 6 HOURS
Refills: 0 | Status: DISCONTINUED | OUTPATIENT
Start: 2023-04-27 | End: 2023-04-28

## 2023-04-27 RX ORDER — HEPARIN SODIUM 5000 [USP'U]/ML
5000 INJECTION INTRAVENOUS; SUBCUTANEOUS EVERY 8 HOURS
Refills: 0 | Status: DISCONTINUED | OUTPATIENT
Start: 2023-04-27 | End: 2023-04-28

## 2023-04-27 RX ORDER — HYDROMORPHONE HYDROCHLORIDE 2 MG/ML
0.5 INJECTION INTRAMUSCULAR; INTRAVENOUS; SUBCUTANEOUS
Refills: 0 | Status: DISCONTINUED | OUTPATIENT
Start: 2023-04-27 | End: 2023-04-27

## 2023-04-27 RX ORDER — OXYCODONE HYDROCHLORIDE 5 MG/1
10 TABLET ORAL EVERY 6 HOURS
Refills: 0 | Status: DISCONTINUED | OUTPATIENT
Start: 2023-04-27 | End: 2023-04-28

## 2023-04-27 RX ORDER — DEXTROSE 50 % IN WATER 50 %
12.5 SYRINGE (ML) INTRAVENOUS ONCE
Refills: 0 | Status: DISCONTINUED | OUTPATIENT
Start: 2023-04-27 | End: 2023-04-28

## 2023-04-27 RX ORDER — ONDANSETRON 8 MG/1
8 TABLET, FILM COATED ORAL ONCE
Refills: 0 | Status: DISCONTINUED | OUTPATIENT
Start: 2023-04-27 | End: 2023-04-27

## 2023-04-27 RX ORDER — ACETAMINOPHEN 500 MG
975 TABLET ORAL EVERY 6 HOURS
Refills: 0 | Status: DISCONTINUED | OUTPATIENT
Start: 2023-04-27 | End: 2023-04-28

## 2023-04-27 RX ADMIN — Medication 975 MILLIGRAM(S): at 15:43

## 2023-04-27 RX ADMIN — Medication 2: at 16:45

## 2023-04-27 RX ADMIN — HEPARIN SODIUM 5000 UNIT(S): 5000 INJECTION INTRAVENOUS; SUBCUTANEOUS at 15:43

## 2023-04-27 RX ADMIN — Medication 100 MILLIGRAM(S): at 15:43

## 2023-04-27 RX ADMIN — HEPARIN SODIUM 5000 UNIT(S): 5000 INJECTION INTRAVENOUS; SUBCUTANEOUS at 22:11

## 2023-04-27 RX ADMIN — Medication 100 MILLIGRAM(S): at 23:29

## 2023-04-27 RX ADMIN — POLYETHYLENE GLYCOL 3350 17 GRAM(S): 17 POWDER, FOR SOLUTION ORAL at 15:43

## 2023-04-27 RX ADMIN — Medication 975 MILLIGRAM(S): at 16:43

## 2023-04-27 RX ADMIN — Medication 2: at 10:56

## 2023-04-27 NOTE — PATIENT PROFILE ADULT - FALL HARM RISK - ATTEMPT OOB
Patient Education   Gaze Stabilization: Tip Card    1.Target must remain in focus, not blurry, and appear stationary while head is in motion.  2.Perform exercises with small head movements (20° to either side of midline).  3.Increase speed of head motion so long as target is in focus.  4.If you wear eyeglasses, be sure you can see target through lens (therapist will give specific instructions for bifocal / progressive lenses).  5.These exercises may provoke dizziness or nausea. Work through these symptoms. If too dizzy, slow head movement slightly. Rest between each exercise.  6.Exercises demand concentration; avoid distractions.  7.For safety, perform standing exercises close to a counter, wall, corner, or next to someone.    Copyright © [employer]. All rights reserved.        Patient Education   Gaze Stabilization: Standing Feet Together        Feet together, keeping eyes on target on wall 4-6 feet away, tilt head down 15-30° and move head side to side for 60 seconds. Repeat while moving head up and down for 60 seconds.  Do 1-3 sessions per day. Begin @ 65 bpm if dizziness is < 2/5 then increase speed by 5 bpm  Repeat using target on pattern background.    Copyright © [employer]. All rights reserved.         No

## 2023-04-27 NOTE — PATIENT PROFILE ADULT - NSTRANSFEREYEGLASSESPAIRS_GEN_A_NUR
[address]  [facility] EMERGENCY DEPT  [id number] [phone]    Work/School Note    Date: 7/31/2022    To Whom It May concern:    Durene Kawasaki was seen and treated today in the emergency room by the following provider(s):  Attending Provider: Ruy Batista MD  Nurse Practitioner: Elza Hannah NP. Durene Kawasaki is excused from work/school on 7/31/2022 through 8/2/2022. She is medically clear to return to work/school on 8/3/2022.          Sincerely,          Maranda Junior NP
1 pair

## 2023-04-27 NOTE — PATIENT PROFILE ADULT - FALL HARM RISK - HARM RISK INTERVENTIONS

## 2023-04-27 NOTE — PATIENT PROFILE ADULT - NSPROREFERSVCHOMEDIABETES_GEN_A_NUR
Discharge home.  Continue home medications as previously directed.  Continue your home oxygen and albuterol nebulizer treatments as previously directed.  Take your Humalog insulin as previously directed.  Follow-up with your doctor in 2 to 3 days.  Continue Norco for pain for your knee.  May follow-up with [place] [facility] this week for further outpatient evaluation and treatment of your severe arthritis in your left knee and Baker's cyst.  Return to ER for any new or worsening symptoms    Care Management/Social Work Discharge Arrangements: No need for help at home was identified during your hospital stay. If you feel you need more help please contact your primary care provider for additional resources.  You indicated there were issues with your [facility]  A technician has been confirmed to arrive today May 31, 2022 to your home between 12p-2pm.  If you have any questions or concerns please contact the office at [phone]  
no

## 2023-04-27 NOTE — BRIEF OPERATIVE NOTE - NSICDXBRIEFPROCEDURE_GEN_ALL_CORE_FT
PROCEDURES:  Cystoscopy with percutaneous left nephrolithotomy 27-Apr-2023 13:45:22  Luís Augustin

## 2023-04-28 ENCOUNTER — TRANSCRIPTION ENCOUNTER (OUTPATIENT)
Age: 68
End: 2023-04-28

## 2023-04-28 VITALS
SYSTOLIC BLOOD PRESSURE: 136 MMHG | RESPIRATION RATE: 17 BRPM | TEMPERATURE: 98 F | DIASTOLIC BLOOD PRESSURE: 74 MMHG | HEART RATE: 76 BPM | OXYGEN SATURATION: 97 %

## 2023-04-28 LAB
ANION GAP SERPL CALC-SCNC: 11 MMOL/L — SIGNIFICANT CHANGE UP (ref 7–14)
BLD GP AB SCN SERPL QL: NEGATIVE — SIGNIFICANT CHANGE UP
BUN SERPL-MCNC: 16 MG/DL — SIGNIFICANT CHANGE UP (ref 7–23)
CALCIUM SERPL-MCNC: 9.6 MG/DL — SIGNIFICANT CHANGE UP (ref 8.4–10.5)
CHLORIDE SERPL-SCNC: 100 MMOL/L — SIGNIFICANT CHANGE UP (ref 98–107)
CO2 SERPL-SCNC: 25 MMOL/L — SIGNIFICANT CHANGE UP (ref 22–31)
CREAT SERPL-MCNC: 0.98 MG/DL — SIGNIFICANT CHANGE UP (ref 0.5–1.3)
EGFR: 85 ML/MIN/1.73M2 — SIGNIFICANT CHANGE UP
GLUCOSE BLDC GLUCOMTR-MCNC: 159 MG/DL — HIGH (ref 70–99)
GLUCOSE BLDC GLUCOMTR-MCNC: 190 MG/DL — HIGH (ref 70–99)
GLUCOSE SERPL-MCNC: 153 MG/DL — HIGH (ref 70–99)
HCT VFR BLD CALC: 37.5 % — LOW (ref 39–50)
HGB BLD-MCNC: 12.3 G/DL — LOW (ref 13–17)
MCHC RBC-ENTMCNC: 29.6 PG — SIGNIFICANT CHANGE UP (ref 27–34)
MCHC RBC-ENTMCNC: 32.8 GM/DL — SIGNIFICANT CHANGE UP (ref 32–36)
MCV RBC AUTO: 90.1 FL — SIGNIFICANT CHANGE UP (ref 80–100)
NRBC # BLD: 0 /100 WBCS — SIGNIFICANT CHANGE UP (ref 0–0)
NRBC # FLD: 0 K/UL — SIGNIFICANT CHANGE UP (ref 0–0)
PLATELET # BLD AUTO: 207 K/UL — SIGNIFICANT CHANGE UP (ref 150–400)
POTASSIUM SERPL-MCNC: 4.6 MMOL/L — SIGNIFICANT CHANGE UP (ref 3.5–5.3)
POTASSIUM SERPL-SCNC: 4.6 MMOL/L — SIGNIFICANT CHANGE UP (ref 3.5–5.3)
RBC # BLD: 4.16 M/UL — LOW (ref 4.2–5.8)
RBC # FLD: 13.2 % — SIGNIFICANT CHANGE UP (ref 10.3–14.5)
RH IG SCN BLD-IMP: POSITIVE — SIGNIFICANT CHANGE UP
SODIUM SERPL-SCNC: 136 MMOL/L — SIGNIFICANT CHANGE UP (ref 135–145)
WBC # BLD: 10.44 K/UL — SIGNIFICANT CHANGE UP (ref 3.8–10.5)
WBC # FLD AUTO: 10.44 K/UL — SIGNIFICANT CHANGE UP (ref 3.8–10.5)

## 2023-04-28 RX ORDER — CEPHALEXIN 500 MG
1 CAPSULE ORAL
Qty: 10 | Refills: 0
Start: 2023-04-28 | End: 2023-05-02

## 2023-04-28 RX ORDER — SITAGLIPTIN 50 MG/1
1 TABLET, FILM COATED ORAL
Qty: 0 | Refills: 0 | DISCHARGE

## 2023-04-28 RX ORDER — POLYETHYLENE GLYCOL 3350 17 G/17G
17 POWDER, FOR SOLUTION ORAL
Qty: 0 | Refills: 0 | DISCHARGE
Start: 2023-04-28

## 2023-04-28 RX ORDER — OXYCODONE HYDROCHLORIDE 5 MG/1
1 TABLET ORAL
Qty: 10 | Refills: 0
Start: 2023-04-28

## 2023-04-28 RX ORDER — ACETAMINOPHEN 500 MG
3 TABLET ORAL
Qty: 0 | Refills: 0 | DISCHARGE
Start: 2023-04-28

## 2023-04-28 RX ORDER — SODIUM CHLORIDE 9 MG/ML
1000 INJECTION INTRAMUSCULAR; INTRAVENOUS; SUBCUTANEOUS
Refills: 0 | Status: DISCONTINUED | OUTPATIENT
Start: 2023-04-28 | End: 2023-04-28

## 2023-04-28 RX ADMIN — HEPARIN SODIUM 5000 UNIT(S): 5000 INJECTION INTRAVENOUS; SUBCUTANEOUS at 13:47

## 2023-04-28 RX ADMIN — Medication 100 MILLIGRAM(S): at 15:29

## 2023-04-28 RX ADMIN — HEPARIN SODIUM 5000 UNIT(S): 5000 INJECTION INTRAVENOUS; SUBCUTANEOUS at 06:04

## 2023-04-28 RX ADMIN — Medication 975 MILLIGRAM(S): at 14:47

## 2023-04-28 RX ADMIN — Medication 1: at 07:30

## 2023-04-28 RX ADMIN — AMLODIPINE BESYLATE 5 MILLIGRAM(S): 2.5 TABLET ORAL at 06:05

## 2023-04-28 RX ADMIN — Medication 1: at 11:31

## 2023-04-28 RX ADMIN — Medication 100 MILLIGRAM(S): at 07:33

## 2023-04-28 RX ADMIN — POLYETHYLENE GLYCOL 3350 17 GRAM(S): 17 POWDER, FOR SOLUTION ORAL at 13:47

## 2023-04-28 RX ADMIN — Medication 975 MILLIGRAM(S): at 13:47

## 2023-04-28 RX ADMIN — LOSARTAN POTASSIUM 100 MILLIGRAM(S): 100 TABLET, FILM COATED ORAL at 06:06

## 2023-04-28 NOTE — DISCHARGE NOTE PROVIDER - CARE PROVIDERS DIRECT ADDRESSES
[email or web address]

## 2023-04-28 NOTE — DISCHARGE NOTE PROVIDER - NSDCCPCAREPLAN_GEN_ALL_CORE_FT
PRINCIPAL DISCHARGE DIAGNOSIS  Diagnosis: Kidney stone  Assessment and Plan of Treatment: Keep well hydrated.  No heavy lifting or straining for 2 to 4 weeks.  You may have some leakage from your back for the next few days, change bandage daily as needed.  You may have intermittent blood tinged urine and slight flank pain when you urinate.  This is normal and due to the stent in your ureter.   If your urine becomes bright red or with clots, please call the office.  The stent is temporary, and must be eventually removed or it may cause problems with infection and kidney damage if left in place greater than 3 months.  Dr. Torres's office will call you with a follow up appointment for stent removal and further management.  Call the office if you have fever greater than 101, difficulty urinating, pain not relieved with pain medication, nausea/vomiting.        SECONDARY DISCHARGE DIAGNOSES  Diagnosis: DM (diabetes mellitus)  Assessment and Plan of Treatment: Continue current home medications and follow up with your primary care provider      Diagnosis: HTN (hypertension)  Assessment and Plan of Treatment: Continue current home medications and follow up with your primary care provider

## 2023-04-28 NOTE — PROGRESS NOTE ADULT - ASSESSMENT
68 yo M s/p left PCNL 4/27/2023, [place] NT/cantu,     4/28 - pain controlled, urine colors light pink tinged, tolerating diet    Plan:  -d/c NT, monitor flank drainage  -d/c cantu if flank dry and color acceptable, check PVR  -f/u AM labs  -continue ancef  -DVT prophy, IS, OOB, ambulate  -possible d/c home later today  
67yM s/p L PSE

## 2023-04-28 NOTE — DISCHARGE NOTE PROVIDER - CARE PROVIDER_API CALL
Ben Torres)  Urology  [address], [address]  [place] [address]  Phone: [phone]  Fax: [phone]  Follow Up Time:     [address] [address]  Phone: [phone]  Fax: [phone]  Follow Up Time:

## 2023-04-28 NOTE — DISCHARGE NOTE PROVIDER - HOSPITAL COURSE
68 yo M s/p left PCNL 4/27/2023, [place] NT/cantu,     4/28 - pain controlled, urine colors light pink tinged, tolerating diet, NT removed, flank remained dry, cantu removed with successful TOV. color acceptable.  Pt d/c on keflex to f/u with Dr. Brian chu checked

## 2023-04-28 NOTE — DISCHARGE NOTE NURSING/CASE MANAGEMENT/SOCIAL WORK - NSDCPNINST_GEN_ALL_CORE
Notify Dr Torres if you experience any increase in pain not relieved with medication, any redness, drainage or swelling around incision, any dark red blood or clots in urine or any fever >100.5.  Drink plenty of fluids.  No heavy lifting or straining.   Complete your course of antibiotics as prescribed.  Use over the counter stool softeners to assist with constipation.

## 2023-04-28 NOTE — PROGRESS NOTE ADULT - SUBJECTIVE AND OBJECTIVE BOX
Overnight events:  None    Subjective:  Pt offers no complaints, pain controlled, tolerating diet    Objective:    Vital signs  T(C): , Max: 37.4 (04-27-23 @ 17:22)  HR: 72 (04-28-23 @ 01:58)  BP: 135/65 (04-28-23 @ 01:58)  SpO2: 97% (04-28-23 @ 01:58)  Wt(kg): --    Output   Pepe: 800 light pink  L NT: 340 pink tinged  04-27 @ 07:01  -  04-28 @ 07:00  --------------------------------------------------------  IN: 1275 mL / OUT: 3280 mL / NET: -2005 mL        Gen: NAD  Abd: soft, nontender, left NT removed easily in normal fashion, no bleeding, dressing applied  : pepe secured    Labs: pending this AM                      
 Note    Post op Check    s/p : L PSE    Pt seen / examined without complaints pain controlled    Vital Signs Last 24 Hrs  T(C): 36.9 (27 Apr 2023 12:14), Max: 36.9 (27 Apr 2023 12:14)  T(F): 98.4 (27 Apr 2023 12:14), Max: 98.4 (27 Apr 2023 12:14)  HR: 70 (27 Apr 2023 12:14) (61 - 70)  BP: 144/83 (27 Apr 2023 12:14) (126/92 - 155/83)  BP(mean): 100 (27 Apr 2023 11:30) (87 - 100)  RR: 16 (27 Apr 2023 12:14) (12 - 22)  SpO2: 97% (27 Apr 2023 12:14) (94% - 100%)    Parameters below as of 27 Apr 2023 10:45  Patient On (Oxygen Delivery Method): room air        I&O's Summary    27 Apr 2023 07:01  -  27 Apr 2023 13:17  --------------------------------------------------------  IN: 525 mL / OUT: 915 mL / NET: -390 mL    1:1:500 EBL  F=500  TCR=823    PHYSICAL EXAM:       Constitutional: awake alert NAD    Respiratory: No resp distress    Cardiovascular: RR    Gastrointestinal: soft NT ND.  L flank dsg saturated; no active bleeding;  L NT light pink    Genitourinary: cantu in place; light pink    Extremities: + venodynes                 13.1   9.16  )-----------( 200      ( 27 Apr 2023 11:01 )             39.6       04-27    133<L>  |  100  |  18  ----------------------------<  237<H>  4.7   |  20<L>  |  1.05    Ca    9.1      27 Apr 2023 11:01            Radiology:  < from: Xray Chest 1 View- PORTABLE-Urgent (Xray Chest 1 View- PORTABLE-Urgent .) (04.27.23 @ 10:56) >    ACC: [id number] EXAM:  XR CHEST PORTABLE URGENT 1V   ORDERED BY: ETIENNE SANCHEZ     PROCEDURE DATE:  04/27/2023          INTERPRETATION:  CLINICAL INFORMATION: Status post percutaneous   nephrolithotomy    TIME OF EXAMINATION: April 27, 2023 at 10:44 AM    EXAM: Portable chest    FINDINGS:  The lungs are clear. The heart is not enlarged. No effusion, pneumothorax   or pneumoperitoneum.        COMPARISON: No prior exams available.        IMPRESSION: Clear lungs.    --- End of Report ---            JUDY ORELLANA MD; Attending Radiologist  This document has been electronically signed. Apr 27 2023 11:13AM    < end of copied text >

## 2023-04-28 NOTE — DISCHARGE NOTE NURSING/CASE MANAGEMENT/SOCIAL WORK - PATIENT PORTAL LINK FT
You can access the FollowMyHealth Patient Portal offered by [facility] by registering at the following website: [email or web address]. By joining [employer] [facility]’s FollowMyHealth portal, you will also be able to view your health information using other applications (apps) compatible with our system.

## 2023-04-28 NOTE — DISCHARGE NOTE PROVIDER - NSDCMRMEDTOKEN_GEN_ALL_CORE_FT
acetaminophen 325 mg oral tablet: 3 tab(s) orally every 6 hours as needed for mild to moderate pain  amLODIPine 5 mg oral tablet: 1 tab(s) orally once a day AM  cephalexin 500 mg oral capsule: 1 cap(s) orally 2 times a day  irbesartan 300 mg oral tablet: 1 tab(s) orally once a day AM  oxyCODONE 5 mg oral tablet: 1 tab(s) orally every 6 hours as needed for severe pain MDD: 4  polyethylene glycol 3350 oral powder for reconstitution: 17 gram(s) orally once a day   acetaminophen 325 mg oral tablet: 3 tab(s) orally every 6 hours as needed for mild to moderate pain  amLODIPine 5 mg oral tablet: 1 tab(s) orally once a day AM  cephalexin 500 mg oral capsule: 1 cap(s) orally 2 times a day  irbesartan 300 mg oral tablet: 1 tab(s) orally once a day AM  Januvia 100 mg oral tablet: 1 orally once a day in the AM  oxyCODONE 5 mg oral tablet: 1 tab(s) orally every 6 hours as needed for severe pain MDD: 4  polyethylene glycol 3350 oral powder for reconstitution: 17 gram(s) orally once a day

## 2023-05-05 LAB
CELL MATERIAL STONE EST-MCNT: SIGNIFICANT CHANGE UP
LABORATORY COMMENT REPORT: SIGNIFICANT CHANGE UP
NIDUS STONE QN: SIGNIFICANT CHANGE UP

## 2024-01-05 NOTE — H&P PST ADULT - PROBLEM/PLAN-1
LISTING EDUCATION    Patient educated regarding the following prior to placement on the transplant waiting list:   The patient?s medical condition, prognosis, and treatment plan.   The expectations and patient responsibilities while on the waiting list, including:   Keeping the transplant center informed of any changes in contact information or insurance coverage   Notifying the transplant center of any changes in medical status   Required testing and/or re-evaluation appointments while awaiting transplant   An overview of the surgical procedure, including potential risks and alternatives.   Information regarding what to expect during the inpatient admission and recovery period.   A discussion regarding organ offers and types of potential donors, including potential risks that may be associated with specific types of donors that could affect the success of the transplant or the health of the patient.  The right to refuse transplantation.     Patient was given the opportunity to have questions answered. Patient was provided a copy of the informed consent for transplant listing.    Transplant Coordinator: Yenifer Emmanuel RN  Transplant Physician: Dr. ENEDINA Wade    Signed listing informed consent received? YES/NO  Patient agrees to be listed for the following:  KDPI > 85% [X]  Donors After Circulatory Death (DCD) [X]  Donors with a Positive Core Antibody for Hepatitis B [X]  Donors with Hepatitis C Virus to recipients with hepatitis C []  Donors with Hepatitis C Virus to Negative hepatitis C recipients []    Patient will be discussed at an upcoming selection committee to determine eligibility to be placed on the [facility] waiting list.    Eval Clinic Note:  Patient attended evaluation appts on 1/5/2023 with Dr. Boucher and Dr. Wade  Medications and allergies reviewed with patient.  Patient presented to appointment with female support person.  Patient ambulated independently, has BKA uses wheelchair for long distances.     History:  Patient has a history of DM II since age 20, CABG x1  Dialysis: HD on T-Th-Sat, since 2/2020.   Testing completed recently: Echocardiogram, Stress Test, Chest X-Ray, CT AP (need images)  Testing needed for evaluation: Echo, Cardiology apt, mammogram, and pap smear.  Listing Consent: KDPI >85%, DCD, Hep B  Comments:  Provided patient with my contact info for questions and concerns.     DISPLAY PLAN FREE TEXT

## 2024-02-14 NOTE — PROGRESS NOTE ADULT - ASSESSMENT
I have personally seen and examined the patient. I have collaborated with and supervised the
s/p Left PCNL 2/14/23, nephrostomy removed this morning  TOV later today, then discharge home (if minimal drainage from flank)  Plan for CT in ~10 days, followed by plans for stent removal (or additional stone treatments).    Ben Torres MD
stable s/p L. PSE  POD#1 - neph tube removed  Plan:  - d/c N.T.  - d/c pepe lewis  - observe color  - OOB  - AM labs  - monitor B/P
stable s/p L. PSE  Plan:  - monitor B/P  - observe color  - OOB  - AM labs

## 2024-04-17 PROBLEM — Z00.00 ENCOUNTER FOR PREVENTIVE HEALTH EXAMINATION: Status: ACTIVE | Noted: 2024-04-17

## 2024-04-22 ENCOUNTER — NON-APPOINTMENT (OUTPATIENT)
Age: 69
End: 2024-04-22

## 2024-04-22 ENCOUNTER — APPOINTMENT (OUTPATIENT)
Dept: ORTHOPEDIC SURGERY | Facility: CLINIC | Age: 69
End: 2024-04-22
Payer: COMMERCIAL

## 2024-04-22 VITALS — WEIGHT: 243 LBS | HEIGHT: 67 IN | BODY MASS INDEX: 38.14 KG/M2

## 2024-04-22 PROCEDURE — 99024 POSTOP FOLLOW-UP VISIT: CPT

## 2024-04-22 PROCEDURE — 73562 X-RAY EXAM OF KNEE 3: CPT | Mod: RT

## 2024-04-22 RX ORDER — METFORMIN HYDROCHLORIDE 500 MG/1
500 TABLET, COATED ORAL
Refills: 0 | Status: ACTIVE | COMMUNITY

## 2024-04-22 RX ORDER — AMLODIPINE BESYLATE 5 MG/1
5 TABLET ORAL
Refills: 0 | Status: ACTIVE | COMMUNITY

## 2024-04-22 RX ORDER — SITAGLIPTIN 100 MG/1
TABLET, FILM COATED ORAL
Refills: 0 | Status: ACTIVE | COMMUNITY

## 2024-05-23 ENCOUNTER — APPOINTMENT (OUTPATIENT)
Dept: ORTHOPEDIC SURGERY | Facility: CLINIC | Age: 69
End: 2024-05-23
Payer: COMMERCIAL

## 2024-05-23 VITALS — BODY MASS INDEX: 36.1 KG/M2 | HEIGHT: 67 IN | WEIGHT: 230 LBS

## 2024-05-23 DIAGNOSIS — S82.091D OTHER FRACTURE OF RIGHT PATELLA, SUBSEQUENT ENCOUNTER FOR CLOSED FRACTURE WITH ROUTINE HEALING: ICD-10-CM

## 2024-05-23 PROCEDURE — 73562 X-RAY EXAM OF KNEE 3: CPT | Mod: RT

## 2024-05-23 PROCEDURE — 99213 OFFICE O/P EST LOW 20 MIN: CPT | Mod: 24

## 2024-05-23 NOTE — END OF VISIT
[FreeTextEntry3] :  All medical record entries made by the Scribe were at my,  Dr. Ko Elizabeth MD., direction and personally dictated by me on 05/23/2024. I have personally reviewed the chart and agree that the record accurately reflects my personal performance of the history, physical exam, assessment and plan.

## 2024-05-23 NOTE — ADDENDUM
[FreeTextEntry1] :  I, Karsten Hirsch wrote this note acting as a scribe for Dr. Ko Elizabeth on May 23, 2024.

## 2024-05-23 NOTE — HISTORY OF PRESENT ILLNESS
[de-identified] : 2nd POA s/p ORIF of Right Patella  [de-identified] : The patient is a 68 year male who returns for the 2nd postoperative visit after undergoing ORIF of Right Patella at [facility]. The surgery was on 04/14/2024. The patient is recovering at home. He reports mild postoperative pain. He can bend the knee fine but some pain when he lifts the knee. He is currently working.  [de-identified] :  Patient is WDWN, alert, and in no acute distress. Breathing is unlabored. He is grossly oriented to person, place, and time.  Right Knee:   Incision is healing well. There are no signs of infection. Normal amount of postoperative edema and tenderness present. Ecchymosis is present.  ROM: 10-60 [de-identified] : AP, lateral, skyline, and tunnel views of the Right knee were obtained today and revealed a comminuted patella fracture stabilized with tension band wire with a plate and multiple screws. The fracture is well aligned.  [de-identified] :  The underlying pathophysiology was reviewed with the patient. XR films were reviewed with the patient. Discussed at length the nature of the patient's condition.   Patient was advised to take OTC medications and topical analgesic for pain management.     Patient was informed that the discomfort from lifting the knee is due to the wires. He was informed of the option of hardware removal a year after surgery if there's discomfort or pain. He was advised to continue strengthening the knee.   The patient wishes to proceed with physical therapy Right Knee ROM and strengthening. A script was given.  All questions answered, understanding verbalized. Patient in agreement with plan of care.   Patient was advised to follow up in 6 weeks or as needed.

## 2024-05-23 NOTE — RETURN TO WORK/SCHOOL
[FreeTextEntry1] : Patient does not require antibiotic prophylaxis for dental treatment.  [FreeTextEntry2] :  Dr. Ko Elizabeth

## 2024-08-26 ENCOUNTER — APPOINTMENT (OUTPATIENT)
Dept: ORTHOPEDIC SURGERY | Facility: CLINIC | Age: 69
End: 2024-08-26
Payer: COMMERCIAL

## 2024-08-26 DIAGNOSIS — S82.091D OTHER FRACTURE OF RIGHT PATELLA, SUBSEQUENT ENCOUNTER FOR CLOSED FRACTURE WITH ROUTINE HEALING: ICD-10-CM

## 2024-08-26 DIAGNOSIS — M25.512 PAIN IN LEFT SHOULDER: ICD-10-CM

## 2024-08-26 PROCEDURE — 99213 OFFICE O/P EST LOW 20 MIN: CPT | Mod: 25

## 2024-08-26 PROCEDURE — 73030 X-RAY EXAM OF SHOULDER: CPT | Mod: LT

## 2024-08-26 PROCEDURE — 20610 DRAIN/INJ JOINT/BURSA W/O US: CPT | Mod: LT

## 2024-08-26 PROCEDURE — 73562 X-RAY EXAM OF KNEE 3: CPT | Mod: RT

## 2024-08-26 NOTE — ADDENDUM
[FreeTextEntry1] : I, Eliu Denton Jr, acted solely as a scribe for Dr. Ko Elizabeth on this date 08/26/2024  All medical record entries made by the Scribe were at my, Dr. Ko Elizabeth, direction and personally dictated by me on 08/26/2024 . I have reviewed the chart and agree that the record accurately reflects my personal performance of the history, physical exam, assessment and plan. I have also personally directed, reviewed, and agreed with the chart.

## 2024-08-26 NOTE — PHYSICAL EXAM
[de-identified] : Patient is WDWN, alert, and in no acute distress. Breathing is unlabored. He is grossly oriented to person, place, and time.  Right Knee:  Incision is healing well. There are no signs of infection. Normal amount of postoperative edema and tenderness present.  ROM: 0-130  Left shoulder tender over deltoid pain with flexion and abduction greater than 90 degrees    [de-identified] : AP, lateral, skyline, and tunnel views of the Right knee were obtained today and revealed a comminuted patella fracture stabilized with tension band wire with a plate and multiple screws. The fracture is well aligned. It is almost fully healed  Left shoulder Xrays 3v reveal a calcific deposit superior to the greater tuberosity

## 2024-08-26 NOTE — DISCUSSION/SUMMARY
[de-identified] : The underlying pathophysiology was reviewed with the patient. XR films were reviewed with the patient. Discussed at length the nature of the patient's condition. The patient is doing well following his right patella ORIF. I reassured the patient that his residual symptoms are well within the nature of his procedure.   Patient was advised to take OTC medications and topical analgesic for pain management.  He was informed of the option of hardware removal a year after surgery if there's discomfort or pain. He was advised to continue with knee strengthening with exercise and physical therapy.  Regarding the patient's symptoms to his left shoulder, the patient wishes to proceed with a cortisone injection today. Under sterile precautions, an injection of 5cc 1% lidocaine without epinephrine and 0.5cc Kenalog 40mg, 0.5cc Dexamethasone was administered into the left posterior subacromial joint space. The patient tolerated the procedure well. Rest and apply ice.  All questions answered, understanding verbalized. Patient in agreement with plan of care.  Patient was advised to follow up in 6 months or as needed.

## 2024-08-26 NOTE — HISTORY OF PRESENT ILLNESS
[de-identified] : The patient is a 68 year male who returns for a postoperative visit after undergoing ORIF of Right Patella at [facility]. The surgery was on 04/14/2024. The patient is recovering at home. He reports mild postoperative pain. He can bend the knee fine but some pain when he lifts the knee. He is currently working.   Today, Aug 26, 2024, the patient presents for follow-up and further management. The patient states that he is doing well following his right patella ORIF. He states that he continues to have residual symptoms, mostly when ambulating up and down stairs. He currently attends physical therapy twice per week which does provide him some alleviation. He notes some pain pertaining to his ankle and left shoulder.

## 2025-02-27 ENCOUNTER — APPOINTMENT (OUTPATIENT)
Dept: ORTHOPEDIC SURGERY | Facility: CLINIC | Age: 70
End: 2025-02-27
Payer: COMMERCIAL

## 2025-02-27 ENCOUNTER — NON-APPOINTMENT (OUTPATIENT)
Age: 70
End: 2025-02-27

## 2025-02-27 VITALS — HEIGHT: 67 IN | BODY MASS INDEX: 34.53 KG/M2 | WEIGHT: 220 LBS

## 2025-02-27 DIAGNOSIS — S82.091D OTHER FRACTURE OF RIGHT PATELLA, SUBSEQUENT ENCOUNTER FOR CLOSED FRACTURE WITH ROUTINE HEALING: ICD-10-CM

## 2025-02-27 PROCEDURE — 99213 OFFICE O/P EST LOW 20 MIN: CPT

## 2025-02-27 PROCEDURE — 73562 X-RAY EXAM OF KNEE 3: CPT | Mod: RT

## 2025-04-03 ENCOUNTER — OUTPATIENT (OUTPATIENT)
Dept: OUTPATIENT SERVICES | Facility: HOSPITAL | Age: 70
LOS: 1 days | End: 2025-04-03
Payer: COMMERCIAL

## 2025-04-03 VITALS
OXYGEN SATURATION: 97 % | DIASTOLIC BLOOD PRESSURE: 77 MMHG | HEIGHT: 68 IN | SYSTOLIC BLOOD PRESSURE: 133 MMHG | TEMPERATURE: 98 F | WEIGHT: 223.11 LBS | RESPIRATION RATE: 15 BRPM | HEART RATE: 70 BPM

## 2025-04-03 DIAGNOSIS — T84.84XA PAIN DUE TO INTERNAL ORTHOPEDIC PROSTHETIC DEVICES, IMPLANTS AND GRAFTS, INITIAL ENCOUNTER: ICD-10-CM

## 2025-04-03 DIAGNOSIS — Z01.818 ENCOUNTER FOR OTHER PREPROCEDURAL EXAMINATION: ICD-10-CM

## 2025-04-03 DIAGNOSIS — Z98.890 OTHER SPECIFIED POSTPROCEDURAL STATES: Chronic | ICD-10-CM

## 2025-04-03 DIAGNOSIS — T85.848A PAIN DUE TO OTHER INTERNAL PROSTHETIC DEVICES, IMPLANTS AND GRAFTS, INITIAL ENCOUNTER: ICD-10-CM

## 2025-04-03 PROBLEM — E66.9 OBESITY, UNSPECIFIED: Chronic | Status: INACTIVE | Noted: 2023-01-30 | Resolved: 2025-04-03

## 2025-04-03 PROBLEM — E66.01 MORBID (SEVERE) OBESITY DUE TO EXCESS CALORIES: Chronic | Status: INACTIVE | Noted: 2023-01-30 | Resolved: 2025-04-03

## 2025-04-03 PROBLEM — E11.9 TYPE 2 DIABETES MELLITUS WITHOUT COMPLICATIONS: Chronic | Status: INACTIVE | Noted: 2023-01-30 | Resolved: 2025-04-03

## 2025-04-03 LAB
A1C WITH ESTIMATED AVERAGE GLUCOSE RESULT: 6.1 % — HIGH (ref 4–5.6)
ALBUMIN SERPL ELPH-MCNC: 4.2 G/DL — SIGNIFICANT CHANGE UP (ref 3.3–5)
ALP SERPL-CCNC: 98 U/L — SIGNIFICANT CHANGE UP (ref 30–120)
ALT FLD-CCNC: 30 U/L — SIGNIFICANT CHANGE UP (ref 10–60)
ANION GAP SERPL CALC-SCNC: 5 MMOL/L — SIGNIFICANT CHANGE UP (ref 5–17)
AST SERPL-CCNC: 14 U/L — SIGNIFICANT CHANGE UP (ref 10–40)
BILIRUB SERPL-MCNC: 0.6 MG/DL — SIGNIFICANT CHANGE UP (ref 0.2–1.2)
BUN SERPL-MCNC: 19 MG/DL — SIGNIFICANT CHANGE UP (ref 7–23)
CALCIUM SERPL-MCNC: 9.9 MG/DL — SIGNIFICANT CHANGE UP (ref 8.4–10.5)
CHLORIDE SERPL-SCNC: 104 MMOL/L — SIGNIFICANT CHANGE UP (ref 96–108)
CO2 SERPL-SCNC: 33 MMOL/L — HIGH (ref 22–31)
CREAT SERPL-MCNC: 0.93 MG/DL — SIGNIFICANT CHANGE UP (ref 0.5–1.3)
EGFR: 89 ML/MIN/1.73M2 — SIGNIFICANT CHANGE UP
EGFR: 89 ML/MIN/1.73M2 — SIGNIFICANT CHANGE UP
ESTIMATED AVERAGE GLUCOSE: 128 MG/DL — HIGH (ref 68–114)
GLUCOSE SERPL-MCNC: 146 MG/DL — HIGH (ref 70–99)
HCT VFR BLD CALC: 45 % — SIGNIFICANT CHANGE UP (ref 39–50)
HGB BLD-MCNC: 15 G/DL — SIGNIFICANT CHANGE UP (ref 13–17)
MCHC RBC-ENTMCNC: 30.5 PG — SIGNIFICANT CHANGE UP (ref 27–34)
MCHC RBC-ENTMCNC: 33.3 G/DL — SIGNIFICANT CHANGE UP (ref 32–36)
MCV RBC AUTO: 91.5 FL — SIGNIFICANT CHANGE UP (ref 80–100)
NRBC BLD AUTO-RTO: 0 /100 WBCS — SIGNIFICANT CHANGE UP (ref 0–0)
PLATELET # BLD AUTO: 237 K/UL — SIGNIFICANT CHANGE UP (ref 150–400)
POTASSIUM SERPL-MCNC: 4.8 MMOL/L — SIGNIFICANT CHANGE UP (ref 3.5–5.3)
POTASSIUM SERPL-SCNC: 4.8 MMOL/L — SIGNIFICANT CHANGE UP (ref 3.5–5.3)
PROT SERPL-MCNC: 8.5 G/DL — HIGH (ref 6–8.3)
RBC # BLD: 4.92 M/UL — SIGNIFICANT CHANGE UP (ref 4.2–5.8)
RBC # FLD: 13.3 % — SIGNIFICANT CHANGE UP (ref 10.3–14.5)
SODIUM SERPL-SCNC: 142 MMOL/L — SIGNIFICANT CHANGE UP (ref 135–145)
WBC # BLD: 8.61 K/UL — SIGNIFICANT CHANGE UP (ref 3.8–10.5)
WBC # FLD AUTO: 8.61 K/UL — SIGNIFICANT CHANGE UP (ref 3.8–10.5)

## 2025-04-03 PROCEDURE — G0463: CPT

## 2025-04-03 PROCEDURE — 36415 COLL VENOUS BLD VENIPUNCTURE: CPT

## 2025-04-03 PROCEDURE — 83036 HEMOGLOBIN GLYCOSYLATED A1C: CPT

## 2025-04-03 PROCEDURE — 80053 COMPREHEN METABOLIC PANEL: CPT

## 2025-04-03 PROCEDURE — 85027 COMPLETE CBC AUTOMATED: CPT

## 2025-04-03 NOTE — H&P PST ADULT - NSICDXPASTMEDICALHX_GEN_ALL_CORE_FT
PAST MEDICAL HISTORY:  Hypertension     Kidney stone     Pain from implanted hardware     Pulmonary fibrosis     Type 2 diabetes mellitus

## 2025-04-03 NOTE — H&P PST ADULT - HISTORY OF PRESENT ILLNESS
· 2/2 COVID  · Monitor and encourage PO intake 70 yo male reports ORIF right patella 1 year ago. He reports pain with flexing right knee.   Now he is scheduled for removal of right patella 2.7mm plate plate screws  and tension band wire on 4/18/2025.

## 2025-04-03 NOTE — H&P PST ADULT - ASSESSMENT
70 yo male is scheduled for removal of right patella 2.7 mm plate screws andtension band wire on 4/18/2025

## 2025-04-18 ENCOUNTER — TRANSCRIPTION ENCOUNTER (OUTPATIENT)
Age: 70
End: 2025-04-18

## 2025-04-18 ENCOUNTER — OUTPATIENT (OUTPATIENT)
Dept: OUTPATIENT SERVICES | Facility: HOSPITAL | Age: 70
LOS: 1 days | End: 2025-04-18
Payer: COMMERCIAL

## 2025-04-18 ENCOUNTER — APPOINTMENT (OUTPATIENT)
Dept: ORTHOPEDIC SURGERY | Facility: HOSPITAL | Age: 70
End: 2025-04-18

## 2025-04-18 VITALS
HEART RATE: 68 BPM | OXYGEN SATURATION: 98 % | SYSTOLIC BLOOD PRESSURE: 140 MMHG | TEMPERATURE: 98 F | RESPIRATION RATE: 18 BRPM | DIASTOLIC BLOOD PRESSURE: 85 MMHG

## 2025-04-18 VITALS
HEART RATE: 68 BPM | OXYGEN SATURATION: 100 % | TEMPERATURE: 97 F | WEIGHT: 225.75 LBS | DIASTOLIC BLOOD PRESSURE: 75 MMHG | SYSTOLIC BLOOD PRESSURE: 139 MMHG | HEIGHT: 67 IN | RESPIRATION RATE: 15 BRPM

## 2025-04-18 DIAGNOSIS — Z98.890 OTHER SPECIFIED POSTPROCEDURAL STATES: Chronic | ICD-10-CM

## 2025-04-18 DIAGNOSIS — T84.84XA PAIN DUE TO INTERNAL ORTHOPEDIC PROSTHETIC DEVICES, IMPLANTS AND GRAFTS, INITIAL ENCOUNTER: ICD-10-CM

## 2025-04-18 LAB
GLUCOSE BLDC GLUCOMTR-MCNC: 118 MG/DL — HIGH (ref 70–99)
GLUCOSE BLDC GLUCOMTR-MCNC: 121 MG/DL — HIGH (ref 70–99)

## 2025-04-18 PROCEDURE — 20680 REMOVAL OF IMPLANT DEEP: CPT | Mod: RT

## 2025-04-18 PROCEDURE — 76000 FLUOROSCOPY <1 HR PHYS/QHP: CPT

## 2025-04-18 PROCEDURE — 82962 GLUCOSE BLOOD TEST: CPT

## 2025-04-18 PROCEDURE — 97161 PT EVAL LOW COMPLEX 20 MIN: CPT

## 2025-04-18 RX ORDER — OXYCODONE HYDROCHLORIDE AND ACETAMINOPHEN 10; 325 MG/1; MG/1
1 TABLET ORAL
Qty: 10 | Refills: 0
Start: 2025-04-18

## 2025-04-18 RX ORDER — APREPITANT 40 MG/1
40 CAPSULE ORAL ONCE
Refills: 0 | Status: COMPLETED | OUTPATIENT
Start: 2025-04-18 | End: 2025-04-18

## 2025-04-18 RX ORDER — POTASSIUM CITRATE 5 MEQ/1
2 TABLET, EXTENDED RELEASE ORAL
Refills: 0 | DISCHARGE

## 2025-04-18 RX ORDER — METFORMIN HYDROCHLORIDE 850 MG/1
2 TABLET ORAL
Refills: 0 | DISCHARGE

## 2025-04-18 RX ORDER — SODIUM CHLORIDE 9 G/1000ML
1000 INJECTION, SOLUTION INTRAVENOUS
Refills: 0 | Status: DISCONTINUED | OUTPATIENT
Start: 2025-04-18 | End: 2025-04-21

## 2025-04-18 RX ORDER — AMLODIPINE BESYLATE 10 MG/1
1 TABLET ORAL
Refills: 0 | DISCHARGE

## 2025-04-18 RX ORDER — IBUPROFEN 200 MG
1 TABLET ORAL
Qty: 30 | Refills: 0
Start: 2025-04-18

## 2025-04-18 RX ORDER — TIRZEPATIDE 7.5 MG/.5ML
5 INJECTION, SOLUTION SUBCUTANEOUS
Refills: 0 | DISCHARGE

## 2025-04-18 RX ORDER — ONDANSETRON HCL/PF 4 MG/2 ML
4 VIAL (ML) INJECTION ONCE
Refills: 0 | Status: DISCONTINUED | OUTPATIENT
Start: 2025-04-18 | End: 2025-04-21

## 2025-04-18 RX ORDER — OXYCODONE HYDROCHLORIDE 30 MG/1
5 TABLET ORAL ONCE
Refills: 0 | Status: DISCONTINUED | OUTPATIENT
Start: 2025-04-18 | End: 2025-04-21

## 2025-04-18 RX ORDER — CEFAZOLIN SODIUM IN 0.9 % NACL 3 G/100 ML
2000 INTRAVENOUS SOLUTION, PIGGYBACK (ML) INTRAVENOUS ONCE
Refills: 0 | Status: COMPLETED | OUTPATIENT
Start: 2025-04-18 | End: 2025-04-18

## 2025-04-18 RX ORDER — HYDROMORPHONE/SOD CHLOR,ISO/PF 2 MG/10 ML
1 SYRINGE (ML) INJECTION
Refills: 0 | Status: DISCONTINUED | OUTPATIENT
Start: 2025-04-18 | End: 2025-04-21

## 2025-04-18 RX ADMIN — Medication 1 APPLICATION(S): at 09:17

## 2025-04-18 RX ADMIN — APREPITANT 40 MILLIGRAM(S): 40 CAPSULE ORAL at 09:17

## 2025-04-18 RX ADMIN — SODIUM CHLORIDE 75 MILLILITER(S): 9 INJECTION, SOLUTION INTRAVENOUS at 14:00

## 2025-04-18 NOTE — ASU DISCHARGE PLAN (ADULT/PEDIATRIC) - CARE PROVIDER_API CALL
Ko Elizabeth  Orthopaedic Surgery  [address]  [place] [address]  Phone: [phone]  Fax: [phone]  Follow Up Time:

## 2025-04-18 NOTE — ASU DISCHARGE PLAN (ADULT/PEDIATRIC) - FINANCIAL ASSISTANCE
[facility] provides services at a reduced cost to those who are determined to be eligible through [facility]’s financial assistance program. Information regarding [facility]’s financial assistance program can be found by going to [email or web address] or by calling [phone].

## 2025-04-18 NOTE — ASU PREOP CHECKLIST - HEIGHT IN FEET
5 [place] Counseling: I discussed with the patient the risks of ixekizumab including but not limited to immunosuppression, serious infections, worsening of inflammatory bowel disease and drug reactions.  The patient understands that monitoring is required including a PPD at baseline and must alert us or the primary physician if symptoms of infection or other concerning signs are noted.

## 2025-04-18 NOTE — ASU DISCHARGE PLAN (ADULT/PEDIATRIC) - ASU DC SPECIAL INSTRUCTIONSFT
ice 20 minutes on alternating with 20 minutes off for 48 hours post op  elevate knee to reduce possible stiffness/swelling  weight bear as tolerated to right lower extremity  call office for appointment  to be seen  10-14 days post op

## 2025-04-18 NOTE — ASU DISCHARGE PLAN (ADULT/PEDIATRIC) - NS MD DC FALL RISK RISK
For information on Fall & Injury Prevention, visit: [email or web address] OR  [email or web address] OR  https://www.cdc.gov/steadi/patient.html

## 2025-04-28 ENCOUNTER — APPOINTMENT (OUTPATIENT)
Dept: ORTHOPEDIC SURGERY | Facility: CLINIC | Age: 70
End: 2025-04-28
Payer: COMMERCIAL

## 2025-04-28 VITALS — WEIGHT: 220 LBS | HEIGHT: 67 IN | BODY MASS INDEX: 34.53 KG/M2

## 2025-04-28 DIAGNOSIS — S82.091D OTHER FRACTURE OF RIGHT PATELLA, SUBSEQUENT ENCOUNTER FOR CLOSED FRACTURE WITH ROUTINE HEALING: ICD-10-CM

## 2025-04-28 PROBLEM — E11.9 TYPE 2 DIABETES MELLITUS WITHOUT COMPLICATIONS: Chronic | Status: ACTIVE | Noted: 2025-04-03

## 2025-04-28 PROBLEM — T85.848A PAIN DUE TO OTHER INTERNAL PROSTHETIC DEVICES, IMPLANTS AND GRAFTS, INITIAL ENCOUNTER: Chronic | Status: ACTIVE | Noted: 2025-04-03

## 2025-04-28 PROCEDURE — 73562 X-RAY EXAM OF KNEE 3: CPT | Mod: RT

## 2025-04-28 PROCEDURE — 99213 OFFICE O/P EST LOW 20 MIN: CPT | Mod: 24

## 2025-06-09 ENCOUNTER — APPOINTMENT (OUTPATIENT)
Dept: ORTHOPEDIC SURGERY | Facility: CLINIC | Age: 70
End: 2025-06-09

## 2025-06-12 ENCOUNTER — APPOINTMENT (OUTPATIENT)
Dept: ORTHOPEDIC SURGERY | Facility: CLINIC | Age: 70
End: 2025-06-12
Payer: COMMERCIAL

## 2025-06-12 VITALS — BODY MASS INDEX: 34.53 KG/M2 | WEIGHT: 220 LBS | HEIGHT: 67 IN

## 2025-06-12 PROCEDURE — 73562 X-RAY EXAM OF KNEE 3: CPT | Mod: RT

## 2025-06-12 PROCEDURE — 99024 POSTOP FOLLOW-UP VISIT: CPT

## (undated) DEVICE — SOL IRR BAG NS 0.9% 3000ML

## (undated) DEVICE — SUT SILK 2-0 30" SH

## (undated) DEVICE — POSITIONER PATIENT SAFETY STRAP 3X60"

## (undated) DEVICE — DEVICE INFLATION AERIS DISP

## (undated) DEVICE — POSITIONER STRAP ARMBOARD VELCRO TS-30

## (undated) DEVICE — PREP BETADINE SPONGE STICKS

## (undated) DEVICE — PREP BETADINE KIT

## (undated) DEVICE — TUBING SET EXTENSION 60" VOL 0.4ML

## (undated) DEVICE — GLV 7.5 PROTEXIS (CREAM) MICRO

## (undated) DEVICE — LABELS BLANK W PEN

## (undated) DEVICE — SUT VICRYL PLUS 3-0 27" RB-1 UNDYED

## (undated) DEVICE — DRAPE FOR 2 TIER TABLE W/ 8" BACK 72" LONG

## (undated) DEVICE — WARMING BLANKET FULL ADULT

## (undated) DEVICE — SOL IRR POUR H2O 500ML

## (undated) DEVICE — SYR CATH TIP 2 OZ

## (undated) DEVICE — TUBING SUCTION 20FT

## (undated) DEVICE — POSITIONER BLUE BOLSTER

## (undated) DEVICE — Device

## (undated) DEVICE — GLV 8 PROTEXIS (BLUE)

## (undated) DEVICE — SYR LUER LOK 20CC

## (undated) DEVICE — SUT MONOCRYL 4-0 18" P-3 UNDYED

## (undated) DEVICE — ELCTR STRYKER NEPTUNE SMOKE EVACUATION PENCIL (GREEN)

## (undated) DEVICE — BASIN SET DOUBLE

## (undated) DEVICE — FOLEY CATH 2-WAY 18FR 5CC LATEX COUNCIL RED

## (undated) DEVICE — VENODYNE/SCD SLEEVE CALF MEDIUM

## (undated) DEVICE — PREP CHLORAPREP HI-LITE ORANGE 26ML

## (undated) DEVICE — CANISTER DISPOSABLE THIN WALL 3000CC

## (undated) DEVICE — TUBING SUCTION NONCONDUCTIVE 6MM X 12FT

## (undated) DEVICE — GLV 7.5 PROTEXIS (WHITE)

## (undated) DEVICE — DRSG STERISTRIPS 0.5 X 4"

## (undated) DEVICE — DRAPE C ARM 41X140"

## (undated) DEVICE — DRAPE NEPHROSCOPY 72X118"

## (undated) DEVICE — DRAPE COVER SNAP 36X30"

## (undated) DEVICE — DRAPE C ARM UNIVERSAL

## (undated) DEVICE — WARMING BLANKET UPPER ADULT

## (undated) DEVICE — TUBING LEVEL ONE NORMOFLO SET

## (undated) DEVICE — DRAPE 3/4 SHEET 52X76"

## (undated) DEVICE — GOWN XL

## (undated) DEVICE — PACK LOWER EXTREMITY

## (undated) DEVICE — DRSG CURITY GAUZE SPONGE 4 X 4" 12-PLY

## (undated) DEVICE — NDL BIOPSY TROCAR TWO-PART 18G X 20CM

## (undated) DEVICE — POSITIONER HEAD REST PRONE

## (undated) DEVICE — GOWN LG

## (undated) DEVICE — DRAPE TOP SHEET 53" X 101"